# Patient Record
Sex: FEMALE | Race: WHITE | NOT HISPANIC OR LATINO | Employment: OTHER | URBAN - METROPOLITAN AREA
[De-identification: names, ages, dates, MRNs, and addresses within clinical notes are randomized per-mention and may not be internally consistent; named-entity substitution may affect disease eponyms.]

---

## 2020-03-13 ENCOUNTER — OFFICE VISIT (OUTPATIENT)
Dept: OPTOMETRY | Facility: CLINIC | Age: 75
End: 2020-03-13
Payer: MEDICARE

## 2020-03-13 DIAGNOSIS — Z83.518 FAMILY HISTORY OF MACULAR DEGENERATION: ICD-10-CM

## 2020-03-13 DIAGNOSIS — H52.7 REFRACTIVE ERROR: ICD-10-CM

## 2020-03-13 DIAGNOSIS — E11.9 TYPE 2 DIABETES MELLITUS WITHOUT RETINOPATHY: Primary | ICD-10-CM

## 2020-03-13 DIAGNOSIS — H18.599 CORNEAL DYSTROPHY, ANTERIOR: ICD-10-CM

## 2020-03-13 DIAGNOSIS — Z96.1 PSEUDOPHAKIA OF BOTH EYES: ICD-10-CM

## 2020-03-13 PROCEDURE — 76514 ECHO EXAM OF EYE THICKNESS: CPT | Mod: 26,S$PBB,, | Performed by: OPTOMETRIST

## 2020-03-13 PROCEDURE — 99212 OFFICE O/P EST SF 10 MIN: CPT | Mod: PBBFAC,PO,25 | Performed by: OPTOMETRIST

## 2020-03-13 PROCEDURE — 92015 PR REFRACTION: ICD-10-PCS | Mod: ,,, | Performed by: OPTOMETRIST

## 2020-03-13 PROCEDURE — 92015 DETERMINE REFRACTIVE STATE: CPT | Mod: ,,, | Performed by: OPTOMETRIST

## 2020-03-13 PROCEDURE — 92004 COMPRE OPH EXAM NEW PT 1/>: CPT | Mod: S$PBB,,, | Performed by: OPTOMETRIST

## 2020-03-13 PROCEDURE — 99999 PR PBB SHADOW E&M-EST. PATIENT-LVL II: CPT | Mod: PBBFAC,,, | Performed by: OPTOMETRIST

## 2020-03-13 PROCEDURE — 92004 PR EYE EXAM, NEW PATIENT,COMPREHESV: ICD-10-PCS | Mod: S$PBB,,, | Performed by: OPTOMETRIST

## 2020-03-13 PROCEDURE — 76514 PR  US, EYE, FOR CORNEAL THICKNESS: ICD-10-PCS | Mod: 26,S$PBB,, | Performed by: OPTOMETRIST

## 2020-03-13 PROCEDURE — 76514 ECHO EXAM OF EYE THICKNESS: CPT | Mod: PBBFAC,PO | Performed by: OPTOMETRIST

## 2020-03-13 PROCEDURE — 99999 PR PBB SHADOW E&M-EST. PATIENT-LVL II: ICD-10-PCS | Mod: PBBFAC,,, | Performed by: OPTOMETRIST

## 2020-03-13 NOTE — LETTER
March 13, 2020      MEÑO Abdalla  [address]  [place] [address]           [place] [facility]  [address].  [place] [address]  Phone: [phone]  Fax: [phone]          Patient: Debbie Metcalf   MR Number: [id number]   YOB: 1945   Date of Visit: 3/13/2020       Dear Rasheeda Donaldson:    Thank you for referring Debbie Metcalf to me for evaluation. Attached you will find relevant portions of my assessment and plan of care.    If you have questions, please do not hesitate to call me. I look forward to following Debbie Metcalf along with you.    Sincerely,    Polo Bunn, OD    Enclosure  CC:  No Recipients    If you would like to receive this communication electronically, please contact [email or web address] or [phone] to request more information on [employer] Link access.    For providers and/or their staff who would like to refer a patient to [facility], please contact us through our one-stop-shop provider referral line, [facility], at [phone].    If you feel you have received this communication in error or would no longer like to receive these types of communications, please e-mail [email or web address]

## 2020-03-13 NOTE — PROGRESS NOTES
HPI     Blur ou at dist, x mos, no assoc pain or red, no relief over time,   constant  Hazy vision  Diabetic eye exam    Last edited by Polo Bunn, OD on 3/13/2020 11:17 AM. (History)            Assessment /Plan     For exam results, see Encounter Report.    Type 2 diabetes mellitus without retinopathy    Corneal dystrophy, anterior    Pseudophakia of both eyes    Family history of macular degeneration    Refractive error      1. No diabetic retinopathy, no csme. Return in 1 year for dilated eye exam.  2. Refer to [facility] for eval to see if acuity corresponds to level of corneal dystrophy. Pachy thick  3. Monitor condition. Patient to report any changes. RTC 1 year recheck.  4. Cont with yearly dfe.  5. New Spec Rx given. Different lens options discussed with patient. RTC 1 year full exam.

## 2020-04-29 ENCOUNTER — TELEPHONE (OUTPATIENT)
Dept: OTOLARYNGOLOGY | Facility: CLINIC | Age: 75
End: 2020-04-29

## 2020-04-29 NOTE — TELEPHONE ENCOUNTER
Left message for patient to call the office regarding her appointment on Tuesday May 5, 2020. Due to Covid-19 we are not seeing patients in the office,but can do a virtual visit.

## 2020-04-29 NOTE — TELEPHONE ENCOUNTER
Spoke with patient and explained to her we are not seeing patients in the office due to Covid-19. I offered her a virtual visit she asked me to cancel because she is not leaving her house.Patient voice understanding.

## 2020-07-06 ENCOUNTER — TELEPHONE (OUTPATIENT)
Dept: OPHTHALMOLOGY | Facility: CLINIC | Age: 75
End: 2020-07-06

## 2020-07-14 ENCOUNTER — TELEPHONE (OUTPATIENT)
Dept: OPHTHALMOLOGY | Facility: CLINIC | Age: 75
End: 2020-07-14

## 2020-07-21 ENCOUNTER — OFFICE VISIT (OUTPATIENT)
Dept: OPHTHALMOLOGY | Facility: CLINIC | Age: 75
End: 2020-07-21
Payer: MEDICARE

## 2020-07-21 DIAGNOSIS — H18.529 ANTERIOR BASEMENT MEMBRANE DYSTROPHY: ICD-10-CM

## 2020-07-21 DIAGNOSIS — H18.519 ENDOTHELIAL CORNEAL DYSTROPHY: Primary | ICD-10-CM

## 2020-07-21 PROCEDURE — 92014 PR EYE EXAM, EST PATIENT,COMPREHESV: ICD-10-PCS | Mod: S$PBB,,, | Performed by: OPHTHALMOLOGY

## 2020-07-21 PROCEDURE — 99999 PR PBB SHADOW E&M-EST. PATIENT-LVL II: ICD-10-PCS | Mod: PBBFAC,,, | Performed by: OPHTHALMOLOGY

## 2020-07-21 PROCEDURE — 99212 OFFICE O/P EST SF 10 MIN: CPT | Mod: PBBFAC | Performed by: OPHTHALMOLOGY

## 2020-07-21 PROCEDURE — 92014 COMPRE OPH EXAM EST PT 1/>: CPT | Mod: S$PBB,,, | Performed by: OPHTHALMOLOGY

## 2020-07-21 PROCEDURE — 99999 PR PBB SHADOW E&M-EST. PATIENT-LVL II: CPT | Mod: PBBFAC,,, | Performed by: OPHTHALMOLOGY

## 2020-07-21 RX ORDER — ATORVASTATIN CALCIUM 20 MG/1
20 TABLET, FILM COATED ORAL DAILY
Status: ON HOLD | COMMUNITY
End: 2022-01-15 | Stop reason: HOSPADM

## 2020-07-21 RX ORDER — ESTRADIOL 2 MG/1
2 TABLET ORAL 2 TIMES DAILY
COMMUNITY

## 2020-07-21 RX ORDER — SODIUM CHLORIDE 50 MG/ML
1 SOLUTION/ DROPS OPHTHALMIC 3 TIMES DAILY
Qty: 15 ML | Refills: 3 | Status: SHIPPED | OUTPATIENT
Start: 2020-07-21 | End: 2021-07-21

## 2020-07-21 RX ORDER — INSULIN ASPART 100 [IU]/ML
INJECTION, SOLUTION INTRAVENOUS; SUBCUTANEOUS
COMMUNITY

## 2020-07-21 NOTE — PROGRESS NOTES
HPI     Ref by Dr. Bunn     T2 DM w/out retinopathy   fuchs  Pseudophakia OU- Lanoux  + fam hx of mac deg    75 Female here for fuch. Pt states that her vision is blurry and   photophobic. Oftentimes, blurry va is intermittent. Recent removal of   cancerous tumor nasal cavity. No other ocular complaints.     Last edited by Kamille Martinez MD on 7/21/2020  3:59 PM. (History)            Assessment /Plan     For exam results, see Encounter Report.    Endothelial corneal dystrophy    Anterior basement membrane dystrophy    Other orders  -     sodium chloride 5% (ADEBAYO 128) 5 % ophthalmic solution; Place 1 drop into both eyes 3 (three) times daily.  Dispense: 15 mL; Refill: 3          T2 DM w/out retinopathy    Fuchs  -  Thick corneas however only minimal guttae  - BCVA 20/60 OD, 20/40 OS -- however OS thicker than OD  - trial of adebayo, can discuss sx if NI    Pseudophakia OU- Lanoux    + fam hx of mac deg          F/up 6 wks - va OU, pachy OU, ARx, DFE and OCT mac OU

## 2020-07-21 NOTE — LETTER
July 22, 2020      Polo Bunn, OD  [address]  [place] [address]           [facility]  [address]  [place] [address]  Phone: [phone]  Fax: [phone]          Patient: Debbie Metcalf   MR Number: [id number]   YOB: 1945   Date of Visit: 7/21/2020       Dear Dr. Polo Bunn:    Thank you for referring Debbie Metcalf to me for evaluation. Attached you will find relevant portions of my assessment and plan of care.    If you have questions, please do not hesitate to call me. I look forward to following Debbie Metcalf along with you.    Sincerely,    Kamille Martinez MD    Enclosure  CC:  No Recipients    If you would like to receive this communication electronically, please contact [email or web address] or [phone] to request more information on [employer] Link access.    For providers and/or their staff who would like to refer a patient to [facility], please contact us through our one-stop-shop provider referral line, [facility], at [phone].    If you feel you have received this communication in error or would no longer like to receive these types of communications, please e-mail [email or web address]

## 2020-09-01 ENCOUNTER — OFFICE VISIT (OUTPATIENT)
Dept: OPHTHALMOLOGY | Facility: CLINIC | Age: 75
End: 2020-09-01
Payer: MEDICARE

## 2020-09-01 ENCOUNTER — TELEPHONE (OUTPATIENT)
Dept: OPHTHALMOLOGY | Facility: CLINIC | Age: 75
End: 2020-09-01

## 2020-09-01 ENCOUNTER — CLINICAL SUPPORT (OUTPATIENT)
Dept: AUDIOLOGY | Facility: CLINIC | Age: 75
End: 2020-09-01
Payer: MEDICARE

## 2020-09-01 ENCOUNTER — OFFICE VISIT (OUTPATIENT)
Dept: OTOLARYNGOLOGY | Facility: CLINIC | Age: 75
End: 2020-09-01
Payer: MEDICARE

## 2020-09-01 DIAGNOSIS — H69.93 DYSFUNCTION OF BOTH EUSTACHIAN TUBES: ICD-10-CM

## 2020-09-01 DIAGNOSIS — H93.13 TINNITUS, BILATERAL: ICD-10-CM

## 2020-09-01 DIAGNOSIS — H53.15 VISUAL DISTORTIONS OF SHAPE AND SIZE: ICD-10-CM

## 2020-09-01 DIAGNOSIS — H90.3 SENSORINEURAL HEARING LOSS (SNHL) OF BOTH EARS: Primary | ICD-10-CM

## 2020-09-01 DIAGNOSIS — H40.003 GLAUCOMA SUSPECT, BOTH EYES: ICD-10-CM

## 2020-09-01 DIAGNOSIS — H90.3 SENSORINEURAL HEARING LOSS, BILATERAL: Primary | ICD-10-CM

## 2020-09-01 DIAGNOSIS — H40.89 OTHER GLAUCOMA OF BOTH EYES: ICD-10-CM

## 2020-09-01 DIAGNOSIS — H18.519 ENDOTHELIAL CORNEAL DYSTROPHY: Primary | ICD-10-CM

## 2020-09-01 DIAGNOSIS — H18.529 ANTERIOR BASEMENT MEMBRANE DYSTROPHY: ICD-10-CM

## 2020-09-01 PROCEDURE — 99999 PR PBB SHADOW E&M-EST. PATIENT-LVL II: CPT | Mod: PBBFAC,,, | Performed by: OPHTHALMOLOGY

## 2020-09-01 PROCEDURE — 92567 TYMPANOMETRY: CPT | Mod: PBBFAC | Performed by: AUDIOLOGIST

## 2020-09-01 PROCEDURE — 99999 PR PBB SHADOW E&M-EST. PATIENT-LVL I: ICD-10-PCS | Mod: PBBFAC,,, | Performed by: AUDIOLOGIST

## 2020-09-01 PROCEDURE — 99203 PR OFFICE/OUTPT VISIT, NEW, LEVL III, 30-44 MIN: ICD-10-PCS | Mod: S$PBB,,, | Performed by: NURSE PRACTITIONER

## 2020-09-01 PROCEDURE — 92133 CPTRZD OPH DX IMG PST SGM ON: CPT | Mod: PBBFAC | Performed by: OPHTHALMOLOGY

## 2020-09-01 PROCEDURE — 99213 OFFICE O/P EST LOW 20 MIN: CPT | Mod: PBBFAC,27,25 | Performed by: NURSE PRACTITIONER

## 2020-09-01 PROCEDURE — 92134 POSTERIOR SEGMENT OCT RETINA (OCULAR COHERENCE TOMOGRAPHY)-BOTH EYES: ICD-10-PCS | Mod: 26,S$PBB,, | Performed by: OPHTHALMOLOGY

## 2020-09-01 PROCEDURE — 99211 OFF/OP EST MAY X REQ PHY/QHP: CPT | Mod: PBBFAC,25 | Performed by: AUDIOLOGIST

## 2020-09-01 PROCEDURE — 99999 PR PBB SHADOW E&M-EST. PATIENT-LVL III: CPT | Mod: PBBFAC,,, | Performed by: NURSE PRACTITIONER

## 2020-09-01 PROCEDURE — 99999 PR PBB SHADOW E&M-EST. PATIENT-LVL II: ICD-10-PCS | Mod: PBBFAC,,, | Performed by: OPHTHALMOLOGY

## 2020-09-01 PROCEDURE — 92014 COMPRE OPH EXAM EST PT 1/>: CPT | Mod: S$PBB,,, | Performed by: OPHTHALMOLOGY

## 2020-09-01 PROCEDURE — 99203 OFFICE O/P NEW LOW 30 MIN: CPT | Mod: S$PBB,,, | Performed by: NURSE PRACTITIONER

## 2020-09-01 PROCEDURE — 99999 PR PBB SHADOW E&M-EST. PATIENT-LVL I: CPT | Mod: PBBFAC,,, | Performed by: AUDIOLOGIST

## 2020-09-01 PROCEDURE — 92557 COMPREHENSIVE HEARING TEST: CPT | Mod: PBBFAC | Performed by: AUDIOLOGIST

## 2020-09-01 PROCEDURE — 99212 OFFICE O/P EST SF 10 MIN: CPT | Mod: PBBFAC,27,25 | Performed by: OPHTHALMOLOGY

## 2020-09-01 PROCEDURE — 92014 PR EYE EXAM, EST PATIENT,COMPREHESV: ICD-10-PCS | Mod: S$PBB,,, | Performed by: OPHTHALMOLOGY

## 2020-09-01 PROCEDURE — 99999 PR PBB SHADOW E&M-EST. PATIENT-LVL III: ICD-10-PCS | Mod: PBBFAC,,, | Performed by: NURSE PRACTITIONER

## 2020-09-01 PROCEDURE — 92134 CPTRZ OPH DX IMG PST SGM RTA: CPT | Mod: PBBFAC | Performed by: OPHTHALMOLOGY

## 2020-09-01 NOTE — TELEPHONE ENCOUNTER
lvm and call back number to change 3 mo f/u to 6 mo f/u           ----- Message from Kamille Martinez MD sent at 9/1/2020  1:47 PM CDT -----  F/up 6 mo -   jose guadalupe f/up

## 2020-09-01 NOTE — PROGRESS NOTES
"  Subjective:      Debbie Metcalf is a 75 y.o. female who was self-referred for hearing loss.    Ms. Metcalf reports difficulty with hearing for the past several years. He has associated intermittent tinnitus. He denies dizziness. He also reports right ear pain that comes and goes with associated "Popping" in ears. He denies ear drainage. He has tried nasal insufflation with some benefit, but states he has not been able to clear his right ear.   There is not a family history of hearing loss at a young age.  There is not a prior history of ear surgery.  There is a prior history of ear infections as a child.  She denies a history of significant noise exposure.  She does not wear hearing aids currently.  She has not had a hearing test recently.     Past Medical History  She has no past medical history on file.    Past Surgical History  She has no past surgical history on file.    Family History  Her family history is not on file.    Social History  She     Allergies  She has No Known Allergies.    Medications  She has a current medication list which includes the following prescription(s): atorvastatin, estradiol, insulin aspart u-100, insulin detemir u-100, lancets, and sodium chloride 5%.    Review of Systems   Constitutional: Negative for chills, fever and unexpected weight change.   HENT: Positive for hearing loss and tinnitus. Negative for congestion, ear discharge, ear pain, facial swelling, postnasal drip, sinus pressure, sore throat and trouble swallowing.    Eyes: Negative for pain and visual disturbance.   Respiratory: Negative for apnea and shortness of breath.    Cardiovascular: Negative for chest pain and palpitations.   Gastrointestinal: Negative for abdominal pain and nausea.   Endocrine: Negative for cold intolerance and heat intolerance.   Musculoskeletal: Negative for joint swelling and neck stiffness.   Skin: Negative for color change and rash.   Neurological: Negative for dizziness, facial " asymmetry and headaches.   Hematological: Negative for adenopathy. Does not bruise/bleed easily.   Psychiatric/Behavioral: Negative for agitation. The patient is not nervous/anxious.           Objective:     There were no vitals taken for this visit.     Constitutional:   She is oriented to person, place, and time. Vital signs are normal. She appears well-developed and well-nourished. She appears alert. Normal speech.      Head:  Normocephalic and atraumatic.     Ears:    Right Ear: No lacerations. No drainage, swelling or tenderness. No foreign bodies. No mastoid tenderness. Tympanic membrane is not injected, not scarred, not perforated, not erythematous, not retracted and not bulging. Tympanic membrane mobility is normal. No middle ear effusion. No hemotympanum. Decreased hearing is noted.   Left Ear: No lacerations. No drainage, swelling or tenderness. No foreign bodies. No mastoid tenderness. Tympanic membrane is not injected, not scarred, not perforated, not erythematous, not retracted and not bulging. Tympanic membrane mobility is normal.  No middle ear effusion. No hemotympanum. Decreased hearing is noted.     Nose:  Nose normal including turbinates, nasal mucosa, sinuses and nasal septum.     Neck:  Neck normal without thyromegaly masses, asymmetry, normal tracheal structure, crepitus, and tenderness and no adenopathy.     Psychiatric:   She has a normal mood and affect. Her speech is normal and behavior is normal.     Neurological:   She is alert and oriented to person, place, and time.       Procedure    None      Data Reviewed    No results found for: WBC  No results found for: PLT   No results found for: CREATININE  No results found for: TSH  No results found for: GLU  No results found for: HGBA1C       I independently reviewed the tracings of the complete audiometric evaluation performed today.  I reviewed the audiogram with the patient as well.  Pertinent findings include left moderate to severe SNHL.  right moderate to severe SNHL. Right SRT 50 with 76% discrimination at 90db. Left SRT 45 with 92% discrimination at 85db. Type A tympanogram in both ears. .         Assessment:     1. Sensorineural hearing loss (SNHL) of both ears    2. Tinnitus, bilateral    3. Dysfunction of both eustachian tubes         Plan:     I had a long discussion with the patient regarding her condition and the further workup and management options.    Audiogram Reviewed: Bilateral SNHL.  Hearing conservation strongly recommended.  Trial of amplification bilaterally also recommended. Priscilla Carl's card provided to patient.  Re-check of hearing in 1 year or sooner if subjective change noted.  I recommend fluticasone 1-2 sprays each nostril once daily for one month then as needed for ETD.    Follow up in about 1 year (around 9/1/2021).

## 2020-09-01 NOTE — PROGRESS NOTES
HPI     Ref by Dr. Bunn     T2 DM w/out retinopathy   fuchs   Pseudophakia OU- Lanoux   + fam hx of mac deg, dad with glc     Adebayo 128 5% tid ou     74 YO female here for 6 week f/u fuchs. Pt reports that her aching has   improved after starting eye gtts. Also mentions that if pt does not   control blood sugar lvls, will start seeing spots/floaters.  No other   ocular complaints.     Last edited by Kamille Martinez MD on 9/1/2020  9:06 AM. (History)            Assessment /Plan     For exam results, see Encounter Report.    Endothelial corneal dystrophy  -     Posterior Segment OCT Retina-Both eyes    Anterior basement membrane dystrophy    Glaucoma suspect, both eyes  -     Posterior Segment OCT Optic Nerve- Both eyes    Other glaucoma of both eyes   -     Posterior Segment OCT Optic Nerve- Both eyes    Visual distortions of shape and size   -     Posterior Segment OCT Retina-Both eyes    Other glaucoma of both eyes  -     Posterior Segment OCT Optic Nerve- Both eyes        T2 DM w/out retinopathy  - OCT mac flat    Fuchs  - VA slightly improved on adebayo - pt notes discomfort better as well  - will consider surgery - wants to hold off for now    Pseudophakia OU- Lanoux    + fam hx of mac deg        GS  - incr c/d OU, OCT nerves wnl - observe    F/up 6 mo - va OU

## 2020-09-01 NOTE — PROGRESS NOTES
Debbie Metcalf was seen today in the clinic for an audiologic evaluation.  Patients main complaint was eustachian tube dysfunction; right worse than left.  Mrs. Metcalf reported a history of chronic ear infections since she was a child.  Mrs. Metcalf also reported she has adult onset Type II Diabetes.    Tympanometry revealed Type A in the right ear and Type A with slight negative pressure in the left ear.  Audiogram results revealed a moderate to severe SNHL in the right ear and a mild to moderate SNHL in the left ear.  Speech reception thresholds were noted at 50 dB in the right ear and 45 dB in the left ear.  Speech discrimination scores were 76% in the right ear and 92% in the left ear.    Recommendations:  1. Otologic evaluation  2. Annual audiogram  3. Noise protection when in noise  4. Hearing aid consultation

## 2020-09-01 NOTE — PATIENT INSTRUCTIONS
How Hearing Aids Can Help You    If youre losing your hearing, it can be frustrating: But, hearing aids can help you hear what youve been missing. Not everyone who has hearing loss needs hearing aids. But if your hearing loss is keeping you from communicating with others, hearing aids will most likely help you.  What hearing aids do  After getting used to your new hearing aids, you may find that:  · You hear and understand speech better in many cases.  · Youre able to join in when talking with a group of people.  · You hear certain speech sounds more clearly.  · You can hear warning signs that help you stay safe, such as a smoke alarm or car horn.  · Life is more enjoyable for you and the people around you.  How hearing aids help you hear  Hearing aids help by making most sounds clearer for the brain. Sounds you cant hear as well are made louder. Hearing aids also filter sound to reduce some background noise. And they soften some sounds that may be too loud. As a result, signals traveling to the brain are easier to understand.  The microphone picks up sound and carries it into the hearing aid. The amplifier makes the sound louder and clearer. The  sends this stronger sound into the ear canal. The stronger sound travels the rest of the way into the ear to the brain.    Setting realistic expectations  Advances in technology have made todays hearing aids better than ever. But your hearing still wont be perfect. You may not hear all sounds. And you wont hear only the things you want to. In noisy places you may still have trouble hearing speech clearly. Even so, you can learn techniques for better listening. Along with hearing aids, these techniques will help you understand whats happening around you much better.   Date Last Reviewed: 9/1/2016  © 4627-6947 [employer]. [address] [place] [address], [place] [address]. All rights reserved. This information is not intended as a substitute for  professional medical care. Always follow your healthcare professional's instructions.        Tinnitus (Ringing in the Ears)     Treatment may include maskers and hearing aids.     Tinnitus is the term for a noise in your ear not caused by an outside sound. The noise might be a ringing, clicking, hiss, or roar. It can vary in pitch and may be soft or quite loud. For some people, tinnitus is a minor nuisance. But for others, the noise can make it hard to hear, work, and even sleep. When tinnitus can't be cured, a number of treatments may offer relief.  What causes tinnitus?  Loud noises, hearing loss, and ear wax can cause tinnitus. So can certain medicines. Large amounts of aspirin or caffeine are sometimes to blame. In many cases, the exact cause of tinnitus is unknown.  How is tinnitus treated?  Identifying and removing the cause is the best way to treat tinnitus. For that reason, your healthcare provider may refer you to an otolaryngologist (ear, nose, and throat doctor). Your hearing may also be checked by an audiologist (hearing specialist). If you have hearing loss, wearing a hearing aid may help your tinnitus. When the cause can't be found, the tinnitus itself may be treated. Some of the treatments are listed below, and your healthcare provider can tell you more about them:  · Maskers are small devices that look like hearing aids. They emit a pleasant sound that helps cover up the ringing in your ears. Hearing aids and maskers are sometimes used together.  · Medicines that treat anxiety and depression may ease tinnitus in some people.  · Hypnosis or relaxation therapy may help head noise seem less severe.  · Tinnitus retraining therapy combines counseling and maskers. Both can help take your mind off the tinnitus.  For more information  · American Speech-Hearing-Language Association 819-647-9546 www.lucius.org  · American Tinnitus Association 965-314-3950 www.meaghan.org  · National [place] on Deafness and other  Communication Disorders 110-907-1351 www.nidcd.nih.gov   Date Last Reviewed: 7/1/2016  © 5603-1459 [employer]. [address] [place] [address], [place] [address]. All rights reserved. This information is not intended as a substitute for professional medical care. Always follow your healthcare professional's instructions.

## 2021-01-21 ENCOUNTER — IMMUNIZATION (OUTPATIENT)
Dept: PHARMACY | Facility: CLINIC | Age: 76
End: 2021-01-21
Payer: MEDICARE

## 2021-01-21 DIAGNOSIS — Z23 NEED FOR VACCINATION: Primary | ICD-10-CM

## 2021-02-18 ENCOUNTER — IMMUNIZATION (OUTPATIENT)
Dept: PHARMACY | Facility: CLINIC | Age: 76
End: 2021-02-18
Payer: MEDICARE

## 2021-02-18 DIAGNOSIS — Z23 NEED FOR VACCINATION: Primary | ICD-10-CM

## 2021-04-15 ENCOUNTER — HOSPITAL ENCOUNTER (INPATIENT)
Facility: HOSPITAL | Age: 76
LOS: 2 days | Discharge: HOME | DRG: 552 | End: 2021-04-18
Attending: EMERGENCY MEDICINE | Admitting: NEUROLOGICAL SURGERY
Payer: MEDICARE
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
## 2021-04-15 DIAGNOSIS — S22.069A CLOSED FRACTURE OF EIGHTH THORACIC VERTEBRA, UNSPECIFIED FRACTURE MORPHOLOGY, INITIAL ENCOUNTER: Primary | ICD-10-CM

## 2021-04-15 DIAGNOSIS — S22.060A CLOSED WEDGE COMPRESSION FRACTURE OF T8 VERTEBRA, INITIAL ENCOUNTER: Primary | ICD-10-CM

## 2021-04-15 LAB
CTP QC/QA: YES
GLUCOSE SERPL-MCNC: 191 MG/DL (ref 70–110)
HCV AB SERPL QL IA: NEGATIVE
POCT GLUCOSE: 191 MG/DL (ref 70–110)
SARS-COV-2 RDRP RESP QL NAA+PROBE: NEGATIVE

## 2021-04-15 PROCEDURE — 99285 EMERGENCY DEPT VISIT HI MDM: CPT | Mod: 25

## 2021-04-15 PROCEDURE — 99223 PR INITIAL HOSPITAL CARE,LEVL III: ICD-10-PCS | Mod: ,,, | Performed by: PHYSICIAN ASSISTANT

## 2021-04-15 PROCEDURE — U0002 COVID-19 LAB TEST NON-CDC: HCPCS | Performed by: EMERGENCY MEDICINE

## 2021-04-15 PROCEDURE — 25000003 PHARM REV CODE 250: Performed by: PHYSICIAN ASSISTANT

## 2021-04-15 PROCEDURE — 86803 HEPATITIS C AB TEST: CPT | Performed by: EMERGENCY MEDICINE

## 2021-04-15 PROCEDURE — 82962 GLUCOSE BLOOD TEST: CPT

## 2021-04-15 PROCEDURE — 99285 PR EMERGENCY DEPT VISIT,LEVEL V: ICD-10-PCS | Mod: CS,,, | Performed by: PHYSICIAN ASSISTANT

## 2021-04-15 PROCEDURE — 99223 1ST HOSP IP/OBS HIGH 75: CPT | Mod: ,,, | Performed by: PHYSICIAN ASSISTANT

## 2021-04-15 PROCEDURE — 99285 EMERGENCY DEPT VISIT HI MDM: CPT | Mod: CS,,, | Performed by: PHYSICIAN ASSISTANT

## 2021-04-15 RX ORDER — SPIRONOLACTONE 25 MG/1
25 TABLET ORAL DAILY
COMMUNITY

## 2021-04-15 RX ORDER — HYDROCODONE BITARTRATE AND ACETAMINOPHEN 10; 325 MG/1; MG/1
1 TABLET ORAL EVERY 6 HOURS PRN
Status: DISCONTINUED | OUTPATIENT
Start: 2021-04-15 | End: 2021-04-18 | Stop reason: HOSPADM

## 2021-04-15 RX ORDER — SPIRONOLACTONE 25 MG/1
25 TABLET ORAL DAILY
Status: DISCONTINUED | OUTPATIENT
Start: 2021-04-16 | End: 2021-04-18 | Stop reason: HOSPADM

## 2021-04-15 RX ORDER — IBUPROFEN 200 MG
24 TABLET ORAL
Status: DISCONTINUED | OUTPATIENT
Start: 2021-04-15 | End: 2021-04-18 | Stop reason: HOSPADM

## 2021-04-15 RX ORDER — MORPHINE SULFATE 2 MG/ML
1 INJECTION, SOLUTION INTRAMUSCULAR; INTRAVENOUS
Status: DISCONTINUED | OUTPATIENT
Start: 2021-04-15 | End: 2021-04-18 | Stop reason: HOSPADM

## 2021-04-15 RX ORDER — INSULIN ASPART 100 [IU]/ML
1-10 INJECTION, SOLUTION INTRAVENOUS; SUBCUTANEOUS
Status: DISCONTINUED | OUTPATIENT
Start: 2021-04-15 | End: 2021-04-18 | Stop reason: HOSPADM

## 2021-04-15 RX ORDER — ATORVASTATIN CALCIUM 20 MG/1
20 TABLET, FILM COATED ORAL DAILY
Status: DISCONTINUED | OUTPATIENT
Start: 2021-04-16 | End: 2021-04-18 | Stop reason: HOSPADM

## 2021-04-15 RX ORDER — GLUCAGON 1 MG
1 KIT INJECTION
Status: DISCONTINUED | OUTPATIENT
Start: 2021-04-15 | End: 2021-04-18 | Stop reason: HOSPADM

## 2021-04-15 RX ORDER — METHOCARBAMOL 750 MG/1
750 TABLET, FILM COATED ORAL EVERY 8 HOURS PRN
Status: DISCONTINUED | OUTPATIENT
Start: 2021-04-15 | End: 2021-04-18 | Stop reason: HOSPADM

## 2021-04-15 RX ORDER — IBUPROFEN 200 MG
16 TABLET ORAL
Status: DISCONTINUED | OUTPATIENT
Start: 2021-04-15 | End: 2021-04-18 | Stop reason: HOSPADM

## 2021-04-15 RX ORDER — OXYCODONE AND ACETAMINOPHEN 5; 325 MG/1; MG/1
1 TABLET ORAL EVERY 6 HOURS PRN
Qty: 12 TABLET | Refills: 0 | Status: SHIPPED | OUTPATIENT
Start: 2021-04-15 | End: 2021-04-18 | Stop reason: SDUPTHER

## 2021-04-15 RX ORDER — SODIUM CHLORIDE 50 MG/ML
1 SOLUTION/ DROPS OPHTHALMIC 3 TIMES DAILY
Status: DISCONTINUED | OUTPATIENT
Start: 2021-04-15 | End: 2021-04-18 | Stop reason: HOSPADM

## 2021-04-15 RX ORDER — HYDROCODONE BITARTRATE AND ACETAMINOPHEN 10; 325 MG/1; MG/1
1 TABLET ORAL
Status: COMPLETED | OUTPATIENT
Start: 2021-04-15 | End: 2021-04-15

## 2021-04-15 RX ORDER — HYDROCODONE BITARTRATE AND ACETAMINOPHEN 5; 325 MG/1; MG/1
1 TABLET ORAL EVERY 4 HOURS PRN
Status: DISCONTINUED | OUTPATIENT
Start: 2021-04-15 | End: 2021-04-15

## 2021-04-15 RX ORDER — ONDANSETRON 4 MG/1
4 TABLET, ORALLY DISINTEGRATING ORAL EVERY 6 HOURS PRN
Status: DISCONTINUED | OUTPATIENT
Start: 2021-04-15 | End: 2021-04-18 | Stop reason: HOSPADM

## 2021-04-15 RX ORDER — ACETAMINOPHEN 500 MG
1000 TABLET ORAL
Status: COMPLETED | OUTPATIENT
Start: 2021-04-15 | End: 2021-04-15

## 2021-04-15 RX ORDER — LIDOCAINE 50 MG/G
1 PATCH TOPICAL ONCE
Status: COMPLETED | OUTPATIENT
Start: 2021-04-15 | End: 2021-04-15

## 2021-04-15 RX ORDER — AMOXICILLIN 250 MG
2 CAPSULE ORAL NIGHTLY PRN
Status: DISCONTINUED | OUTPATIENT
Start: 2021-04-15 | End: 2021-04-18 | Stop reason: HOSPADM

## 2021-04-15 RX ADMIN — LIDOCAINE 1 PATCH: 50 PATCH TOPICAL at 11:04

## 2021-04-15 RX ADMIN — ACETAMINOPHEN 1000 MG: 500 TABLET, FILM COATED ORAL at 11:04

## 2021-04-15 RX ADMIN — HYDROCODONE BITARTRATE AND ACETAMINOPHEN 1 TABLET: 10; 325 TABLET ORAL at 07:04

## 2021-04-16 ENCOUNTER — TELEPHONE (OUTPATIENT)
Dept: NEUROSURGERY | Facility: CLINIC | Age: 76
End: 2021-04-16

## 2021-04-16 LAB
ABO + RH BLD: NORMAL
ANION GAP SERPL CALC-SCNC: 8 MMOL/L (ref 8–16)
APTT BLDCRRT: 22.6 SEC (ref 21–32)
BASOPHILS # BLD AUTO: 0.06 K/UL (ref 0–0.2)
BASOPHILS NFR BLD: 0.6 % (ref 0–1.9)
BLD GP AB SCN CELLS X3 SERPL QL: NORMAL
BUN SERPL-MCNC: 14 MG/DL (ref 8–23)
CALCIUM SERPL-MCNC: 9.3 MG/DL (ref 8.7–10.5)
CHLORIDE SERPL-SCNC: 102 MMOL/L (ref 95–110)
CO2 SERPL-SCNC: 26 MMOL/L (ref 23–29)
CREAT SERPL-MCNC: 1 MG/DL (ref 0.5–1.4)
DIFFERENTIAL METHOD: ABNORMAL
EOSINOPHIL # BLD AUTO: 0.2 K/UL (ref 0–0.5)
EOSINOPHIL NFR BLD: 2 % (ref 0–8)
ERYTHROCYTE [DISTWIDTH] IN BLOOD BY AUTOMATED COUNT: 12.3 % (ref 11.5–14.5)
EST. GFR  (AFRICAN AMERICAN): >60 ML/MIN/1.73 M^2
EST. GFR  (NON AFRICAN AMERICAN): 56 ML/MIN/1.73 M^2
GLUCOSE SERPL-MCNC: 113 MG/DL (ref 70–110)
HCT VFR BLD AUTO: 40 % (ref 37–48.5)
HGB BLD-MCNC: 12.9 G/DL (ref 12–16)
IMM GRANULOCYTES # BLD AUTO: 0.02 K/UL (ref 0–0.04)
IMM GRANULOCYTES NFR BLD AUTO: 0.2 % (ref 0–0.5)
INR PPP: 0.9 (ref 0.8–1.2)
LYMPHOCYTES # BLD AUTO: 3 K/UL (ref 1–4.8)
LYMPHOCYTES NFR BLD: 31.3 % (ref 18–48)
MCH RBC QN AUTO: 29.6 PG (ref 27–31)
MCHC RBC AUTO-ENTMCNC: 32.3 G/DL (ref 32–36)
MCV RBC AUTO: 92 FL (ref 82–98)
MONOCYTES # BLD AUTO: 0.8 K/UL (ref 0.3–1)
MONOCYTES NFR BLD: 8 % (ref 4–15)
NEUTROPHILS # BLD AUTO: 5.5 K/UL (ref 1.8–7.7)
NEUTROPHILS NFR BLD: 57.9 % (ref 38–73)
NRBC BLD-RTO: 0 /100 WBC
PLATELET # BLD AUTO: 232 K/UL (ref 150–450)
PMV BLD AUTO: 8.9 FL (ref 9.2–12.9)
POCT GLUCOSE: 145 MG/DL (ref 70–110)
POCT GLUCOSE: 158 MG/DL (ref 70–110)
POTASSIUM SERPL-SCNC: 4.3 MMOL/L (ref 3.5–5.1)
PROTHROMBIN TIME: 10.3 SEC (ref 9–12.5)
RBC # BLD AUTO: 4.36 M/UL (ref 4–5.4)
SODIUM SERPL-SCNC: 136 MMOL/L (ref 136–145)
WBC # BLD AUTO: 9.54 K/UL (ref 3.9–12.7)

## 2021-04-16 PROCEDURE — 25000003 PHARM REV CODE 250: Performed by: PHYSICIAN ASSISTANT

## 2021-04-16 PROCEDURE — 99223 PR INITIAL HOSPITAL CARE,LEVL III: ICD-10-PCS | Mod: AI,,, | Performed by: PHYSICIAN ASSISTANT

## 2021-04-16 PROCEDURE — 85730 THROMBOPLASTIN TIME PARTIAL: CPT | Performed by: PHYSICIAN ASSISTANT

## 2021-04-16 PROCEDURE — 85610 PROTHROMBIN TIME: CPT | Performed by: PHYSICIAN ASSISTANT

## 2021-04-16 PROCEDURE — 86900 BLOOD TYPING SEROLOGIC ABO: CPT | Performed by: PHYSICIAN ASSISTANT

## 2021-04-16 PROCEDURE — 80048 BASIC METABOLIC PNL TOTAL CA: CPT | Performed by: PHYSICIAN ASSISTANT

## 2021-04-16 PROCEDURE — 11000001 HC ACUTE MED/SURG PRIVATE ROOM

## 2021-04-16 PROCEDURE — 85025 COMPLETE CBC W/AUTO DIFF WBC: CPT | Performed by: PHYSICIAN ASSISTANT

## 2021-04-16 PROCEDURE — 99223 1ST HOSP IP/OBS HIGH 75: CPT | Mod: AI,,, | Performed by: PHYSICIAN ASSISTANT

## 2021-04-16 PROCEDURE — 63600175 PHARM REV CODE 636 W HCPCS: Performed by: PHYSICIAN ASSISTANT

## 2021-04-16 RX ORDER — ESTRADIOL 1 MG/1
2 TABLET ORAL 2 TIMES DAILY
Status: DISCONTINUED | OUTPATIENT
Start: 2021-04-16 | End: 2021-04-18 | Stop reason: HOSPADM

## 2021-04-16 RX ORDER — HEPARIN SODIUM 5000 [USP'U]/ML
5000 INJECTION, SOLUTION INTRAVENOUS; SUBCUTANEOUS EVERY 8 HOURS
Status: DISCONTINUED | OUTPATIENT
Start: 2021-04-16 | End: 2021-04-18 | Stop reason: HOSPADM

## 2021-04-16 RX ORDER — HYDRALAZINE HYDROCHLORIDE 25 MG/1
25 TABLET, FILM COATED ORAL EVERY 8 HOURS PRN
Status: DISCONTINUED | OUTPATIENT
Start: 2021-04-16 | End: 2021-04-18 | Stop reason: HOSPADM

## 2021-04-16 RX ADMIN — SPIRONOLACTONE 25 MG: 25 TABLET, FILM COATED ORAL at 11:04

## 2021-04-16 RX ADMIN — SODIUM CHLORIDE 1 DROP: 50 SOLUTION/ DROPS OPHTHALMIC at 09:04

## 2021-04-16 RX ADMIN — HYDROCODONE BITARTRATE AND ACETAMINOPHEN 1 TABLET: 10; 325 TABLET ORAL at 06:04

## 2021-04-16 RX ADMIN — HYDROCODONE BITARTRATE AND ACETAMINOPHEN 1 TABLET: 10; 325 TABLET ORAL at 09:04

## 2021-04-16 RX ADMIN — HEPARIN SODIUM 5000 UNITS: 5000 INJECTION INTRAVENOUS; SUBCUTANEOUS at 10:04

## 2021-04-16 RX ADMIN — SODIUM CHLORIDE 1 DROP: 50 SOLUTION/ DROPS OPHTHALMIC at 08:04

## 2021-04-16 RX ADMIN — ESTRADIOL 2 MG: 1 TABLET ORAL at 08:04

## 2021-04-16 RX ADMIN — SODIUM CHLORIDE 1 DROP: 50 SOLUTION/ DROPS OPHTHALMIC at 12:04

## 2021-04-16 RX ADMIN — HYDROCODONE BITARTRATE AND ACETAMINOPHEN 1 TABLET: 10; 325 TABLET ORAL at 03:04

## 2021-04-16 RX ADMIN — ATORVASTATIN CALCIUM 20 MG: 20 TABLET, FILM COATED ORAL at 09:04

## 2021-04-17 LAB
ANION GAP SERPL CALC-SCNC: 13 MMOL/L (ref 8–16)
ANION GAP SERPL CALC-SCNC: 9 MMOL/L (ref 8–16)
BASOPHILS # BLD AUTO: 0.06 K/UL (ref 0–0.2)
BASOPHILS NFR BLD: 0.7 % (ref 0–1.9)
BUN SERPL-MCNC: 13 MG/DL (ref 8–23)
BUN SERPL-MCNC: 15 MG/DL (ref 8–23)
CALCIUM SERPL-MCNC: 8.7 MG/DL (ref 8.7–10.5)
CALCIUM SERPL-MCNC: 9 MG/DL (ref 8.7–10.5)
CHLORIDE SERPL-SCNC: 101 MMOL/L (ref 95–110)
CHLORIDE SERPL-SCNC: 99 MMOL/L (ref 95–110)
CO2 SERPL-SCNC: 21 MMOL/L (ref 23–29)
CO2 SERPL-SCNC: 25 MMOL/L (ref 23–29)
CREAT SERPL-MCNC: 1.1 MG/DL (ref 0.5–1.4)
CREAT SERPL-MCNC: 1.3 MG/DL (ref 0.5–1.4)
DIFFERENTIAL METHOD: ABNORMAL
EOSINOPHIL # BLD AUTO: 0.2 K/UL (ref 0–0.5)
EOSINOPHIL NFR BLD: 2.5 % (ref 0–8)
ERYTHROCYTE [DISTWIDTH] IN BLOOD BY AUTOMATED COUNT: 12.4 % (ref 11.5–14.5)
EST. GFR  (AFRICAN AMERICAN): 47 ML/MIN/1.73 M^2
EST. GFR  (AFRICAN AMERICAN): 57.6 ML/MIN/1.73 M^2
EST. GFR  (NON AFRICAN AMERICAN): 40.8 ML/MIN/1.73 M^2
EST. GFR  (NON AFRICAN AMERICAN): 49.9 ML/MIN/1.73 M^2
GLUCOSE SERPL-MCNC: 171 MG/DL (ref 70–110)
GLUCOSE SERPL-MCNC: 215 MG/DL (ref 70–110)
HCT VFR BLD AUTO: 44 % (ref 37–48.5)
HGB BLD-MCNC: 14 G/DL (ref 12–16)
IMM GRANULOCYTES # BLD AUTO: 0.02 K/UL (ref 0–0.04)
IMM GRANULOCYTES NFR BLD AUTO: 0.2 % (ref 0–0.5)
LYMPHOCYTES # BLD AUTO: 3.9 K/UL (ref 1–4.8)
LYMPHOCYTES NFR BLD: 44.5 % (ref 18–48)
MCH RBC QN AUTO: 30 PG (ref 27–31)
MCHC RBC AUTO-ENTMCNC: 31.8 G/DL (ref 32–36)
MCV RBC AUTO: 94 FL (ref 82–98)
MONOCYTES # BLD AUTO: 0.7 K/UL (ref 0.3–1)
MONOCYTES NFR BLD: 7.5 % (ref 4–15)
NEUTROPHILS # BLD AUTO: 3.9 K/UL (ref 1.8–7.7)
NEUTROPHILS NFR BLD: 44.6 % (ref 38–73)
NRBC BLD-RTO: 0 /100 WBC
PLATELET # BLD AUTO: 201 K/UL (ref 150–450)
PLATELET BLD QL SMEAR: ABNORMAL
PMV BLD AUTO: 10.9 FL (ref 9.2–12.9)
POCT GLUCOSE: 207 MG/DL (ref 70–110)
POCT GLUCOSE: 220 MG/DL (ref 70–110)
POCT GLUCOSE: 222 MG/DL (ref 70–110)
POCT GLUCOSE: 254 MG/DL (ref 70–110)
POTASSIUM SERPL-SCNC: 4.5 MMOL/L (ref 3.5–5.1)
POTASSIUM SERPL-SCNC: 5.5 MMOL/L (ref 3.5–5.1)
RBC # BLD AUTO: 4.67 M/UL (ref 4–5.4)
SODIUM SERPL-SCNC: 133 MMOL/L (ref 136–145)
SODIUM SERPL-SCNC: 135 MMOL/L (ref 136–145)
WBC # BLD AUTO: 8.66 K/UL (ref 3.9–12.7)

## 2021-04-17 PROCEDURE — 36415 COLL VENOUS BLD VENIPUNCTURE: CPT | Performed by: PHYSICIAN ASSISTANT

## 2021-04-17 PROCEDURE — 36415 COLL VENOUS BLD VENIPUNCTURE: CPT | Performed by: STUDENT IN AN ORGANIZED HEALTH CARE EDUCATION/TRAINING PROGRAM

## 2021-04-17 PROCEDURE — 85025 COMPLETE CBC W/AUTO DIFF WBC: CPT | Performed by: PHYSICIAN ASSISTANT

## 2021-04-17 PROCEDURE — 63600175 PHARM REV CODE 636 W HCPCS: Performed by: PHYSICIAN ASSISTANT

## 2021-04-17 PROCEDURE — 80048 BASIC METABOLIC PNL TOTAL CA: CPT | Mod: 91 | Performed by: STUDENT IN AN ORGANIZED HEALTH CARE EDUCATION/TRAINING PROGRAM

## 2021-04-17 PROCEDURE — 11000001 HC ACUTE MED/SURG PRIVATE ROOM

## 2021-04-17 PROCEDURE — 25000003 PHARM REV CODE 250: Performed by: PHYSICIAN ASSISTANT

## 2021-04-17 PROCEDURE — 80048 BASIC METABOLIC PNL TOTAL CA: CPT | Performed by: PHYSICIAN ASSISTANT

## 2021-04-17 RX ADMIN — ESTRADIOL 2 MG: 1 TABLET ORAL at 10:04

## 2021-04-17 RX ADMIN — HEPARIN SODIUM 5000 UNITS: 5000 INJECTION INTRAVENOUS; SUBCUTANEOUS at 09:04

## 2021-04-17 RX ADMIN — HEPARIN SODIUM 5000 UNITS: 5000 INJECTION INTRAVENOUS; SUBCUTANEOUS at 02:04

## 2021-04-17 RX ADMIN — HYDROCODONE BITARTRATE AND ACETAMINOPHEN 1 TABLET: 10; 325 TABLET ORAL at 12:04

## 2021-04-17 RX ADMIN — ATORVASTATIN CALCIUM 20 MG: 20 TABLET, FILM COATED ORAL at 08:04

## 2021-04-17 RX ADMIN — HYDROCODONE BITARTRATE AND ACETAMINOPHEN 1 TABLET: 10; 325 TABLET ORAL at 06:04

## 2021-04-17 RX ADMIN — INSULIN ASPART 4 UNITS: 100 INJECTION, SOLUTION INTRAVENOUS; SUBCUTANEOUS at 12:04

## 2021-04-17 RX ADMIN — HEPARIN SODIUM 5000 UNITS: 5000 INJECTION INTRAVENOUS; SUBCUTANEOUS at 05:04

## 2021-04-17 RX ADMIN — SODIUM CHLORIDE 1 DROP: 50 SOLUTION/ DROPS OPHTHALMIC at 09:04

## 2021-04-17 RX ADMIN — METHOCARBAMOL 750 MG: 750 TABLET ORAL at 08:04

## 2021-04-17 RX ADMIN — SPIRONOLACTONE 25 MG: 25 TABLET, FILM COATED ORAL at 08:04

## 2021-04-17 RX ADMIN — METHOCARBAMOL 750 MG: 750 TABLET ORAL at 12:04

## 2021-04-17 RX ADMIN — ESTRADIOL 2 MG: 1 TABLET ORAL at 08:04

## 2021-04-17 RX ADMIN — INSULIN ASPART 4 UNITS: 100 INJECTION, SOLUTION INTRAVENOUS; SUBCUTANEOUS at 08:04

## 2021-04-17 RX ADMIN — SODIUM CHLORIDE 1 DROP: 50 SOLUTION/ DROPS OPHTHALMIC at 04:04

## 2021-04-17 RX ADMIN — SODIUM CHLORIDE 1 DROP: 50 SOLUTION/ DROPS OPHTHALMIC at 08:04

## 2021-04-17 RX ADMIN — INSULIN ASPART 4 UNITS: 100 INJECTION, SOLUTION INTRAVENOUS; SUBCUTANEOUS at 04:04

## 2021-04-17 RX ADMIN — INSULIN ASPART 6 UNITS: 100 INJECTION, SOLUTION INTRAVENOUS; SUBCUTANEOUS at 09:04

## 2021-04-18 VITALS
TEMPERATURE: 99 F | OXYGEN SATURATION: 95 % | HEART RATE: 63 BPM | SYSTOLIC BLOOD PRESSURE: 153 MMHG | RESPIRATION RATE: 16 BRPM | WEIGHT: 238.13 LBS | HEIGHT: 72 IN | DIASTOLIC BLOOD PRESSURE: 81 MMHG | BODY MASS INDEX: 32.25 KG/M2

## 2021-04-18 LAB
ANION GAP SERPL CALC-SCNC: 8 MMOL/L (ref 8–16)
BASOPHILS # BLD AUTO: 0.04 K/UL (ref 0–0.2)
BASOPHILS NFR BLD: 0.5 % (ref 0–1.9)
BUN SERPL-MCNC: 13 MG/DL (ref 8–23)
CALCIUM SERPL-MCNC: 8.4 MG/DL (ref 8.7–10.5)
CHLORIDE SERPL-SCNC: 101 MMOL/L (ref 95–110)
CO2 SERPL-SCNC: 23 MMOL/L (ref 23–29)
CREAT SERPL-MCNC: 1.1 MG/DL (ref 0.5–1.4)
DIFFERENTIAL METHOD: NORMAL
EOSINOPHIL # BLD AUTO: 0.2 K/UL (ref 0–0.5)
EOSINOPHIL NFR BLD: 2.5 % (ref 0–8)
ERYTHROCYTE [DISTWIDTH] IN BLOOD BY AUTOMATED COUNT: 12.1 % (ref 11.5–14.5)
EST. GFR  (AFRICAN AMERICAN): 57.6 ML/MIN/1.73 M^2
EST. GFR  (NON AFRICAN AMERICAN): 49.9 ML/MIN/1.73 M^2
GLUCOSE SERPL-MCNC: 182 MG/DL (ref 70–110)
HCT VFR BLD AUTO: 40 % (ref 37–48.5)
HGB BLD-MCNC: 12.8 G/DL (ref 12–16)
IMM GRANULOCYTES # BLD AUTO: 0.03 K/UL (ref 0–0.04)
IMM GRANULOCYTES NFR BLD AUTO: 0.4 % (ref 0–0.5)
LYMPHOCYTES # BLD AUTO: 3.5 K/UL (ref 1–4.8)
LYMPHOCYTES NFR BLD: 46.8 % (ref 18–48)
MCH RBC QN AUTO: 30.1 PG (ref 27–31)
MCHC RBC AUTO-ENTMCNC: 32 G/DL (ref 32–36)
MCV RBC AUTO: 94 FL (ref 82–98)
MONOCYTES # BLD AUTO: 0.6 K/UL (ref 0.3–1)
MONOCYTES NFR BLD: 7.4 % (ref 4–15)
NEUTROPHILS # BLD AUTO: 3.2 K/UL (ref 1.8–7.7)
NEUTROPHILS NFR BLD: 42.4 % (ref 38–73)
NRBC BLD-RTO: 0 /100 WBC
PLATELET # BLD AUTO: 243 K/UL (ref 150–450)
PMV BLD AUTO: 9.2 FL (ref 9.2–12.9)
POCT GLUCOSE: 148 MG/DL (ref 70–110)
POCT GLUCOSE: 158 MG/DL (ref 70–110)
POTASSIUM SERPL-SCNC: 4.7 MMOL/L (ref 3.5–5.1)
RBC # BLD AUTO: 4.25 M/UL (ref 4–5.4)
SODIUM SERPL-SCNC: 132 MMOL/L (ref 136–145)
WBC # BLD AUTO: 7.48 K/UL (ref 3.9–12.7)

## 2021-04-18 PROCEDURE — 36415 COLL VENOUS BLD VENIPUNCTURE: CPT | Performed by: PHYSICIAN ASSISTANT

## 2021-04-18 PROCEDURE — 25000003 PHARM REV CODE 250: Performed by: PHYSICIAN ASSISTANT

## 2021-04-18 PROCEDURE — 63600175 PHARM REV CODE 636 W HCPCS: Performed by: PHYSICIAN ASSISTANT

## 2021-04-18 PROCEDURE — 80048 BASIC METABOLIC PNL TOTAL CA: CPT | Performed by: PHYSICIAN ASSISTANT

## 2021-04-18 PROCEDURE — 85025 COMPLETE CBC W/AUTO DIFF WBC: CPT | Performed by: PHYSICIAN ASSISTANT

## 2021-04-18 RX ORDER — METHOCARBAMOL 750 MG/1
750 TABLET, FILM COATED ORAL EVERY 8 HOURS PRN
Qty: 30 TABLET | Refills: 0 | Status: SHIPPED | OUTPATIENT
Start: 2021-04-18 | End: 2021-05-02

## 2021-04-18 RX ORDER — OXYCODONE AND ACETAMINOPHEN 5; 325 MG/1; MG/1
1 TABLET ORAL EVERY 6 HOURS PRN
Qty: 12 TABLET | Refills: 0 | Status: SHIPPED | OUTPATIENT
Start: 2021-04-18 | End: 2021-04-25

## 2021-04-18 RX ADMIN — ATORVASTATIN CALCIUM 20 MG: 20 TABLET, FILM COATED ORAL at 08:04

## 2021-04-18 RX ADMIN — ESTRADIOL 2 MG: 1 TABLET ORAL at 08:04

## 2021-04-18 RX ADMIN — SPIRONOLACTONE 25 MG: 25 TABLET, FILM COATED ORAL at 08:04

## 2021-04-18 RX ADMIN — HEPARIN SODIUM 5000 UNITS: 5000 INJECTION INTRAVENOUS; SUBCUTANEOUS at 05:04

## 2021-04-18 RX ADMIN — HYDROCODONE BITARTRATE AND ACETAMINOPHEN 1 TABLET: 10; 325 TABLET ORAL at 12:04

## 2021-04-18 RX ADMIN — SODIUM CHLORIDE 1 DROP: 50 SOLUTION/ DROPS OPHTHALMIC at 08:04

## 2021-04-19 ENCOUNTER — TELEPHONE (OUTPATIENT)
Dept: NEUROSURGERY | Facility: CLINIC | Age: 76
End: 2021-04-19

## 2021-04-20 ENCOUNTER — PATIENT OUTREACH (OUTPATIENT)
Dept: ADMINISTRATIVE | Facility: CLINIC | Age: 76
End: 2021-04-20

## 2021-04-20 DIAGNOSIS — S22.069A CLOSED FRACTURE OF EIGHTH THORACIC VERTEBRA, UNSPECIFIED FRACTURE MORPHOLOGY, INITIAL ENCOUNTER: Primary | ICD-10-CM

## 2021-04-23 ENCOUNTER — CARE AT HOME (OUTPATIENT)
Dept: HOME HEALTH SERVICES | Facility: CLINIC | Age: 76
End: 2021-04-23
Payer: MEDICARE

## 2021-04-23 DIAGNOSIS — S22.069A CLOSED FRACTURE OF EIGHTH THORACIC VERTEBRA, UNSPECIFIED FRACTURE MORPHOLOGY, INITIAL ENCOUNTER: ICD-10-CM

## 2021-04-23 PROCEDURE — 99495 TCM SERVICES (MODERATE COMPLEXITY): ICD-10-PCS | Mod: S$GLB,,, | Performed by: NURSE PRACTITIONER

## 2021-04-23 PROCEDURE — 99495 TRANSJ CARE MGMT MOD F2F 14D: CPT | Mod: S$GLB,,, | Performed by: NURSE PRACTITIONER

## 2021-04-23 PROCEDURE — 99497 PR ADVNCD CARE PLAN 30 MIN: ICD-10-PCS | Mod: 25,S$GLB,, | Performed by: NURSE PRACTITIONER

## 2021-04-23 PROCEDURE — 99497 ADVNCD CARE PLAN 30 MIN: CPT | Mod: 25,S$GLB,, | Performed by: NURSE PRACTITIONER

## 2021-05-03 VITALS
DIASTOLIC BLOOD PRESSURE: 72 MMHG | RESPIRATION RATE: 18 BRPM | TEMPERATURE: 97 F | SYSTOLIC BLOOD PRESSURE: 133 MMHG | HEIGHT: 72 IN | BODY MASS INDEX: 32.23 KG/M2 | OXYGEN SATURATION: 96 % | HEART RATE: 68 BPM | WEIGHT: 238 LBS

## 2021-05-05 ENCOUNTER — EXTERNAL HOME HEALTH (OUTPATIENT)
Dept: HOME HEALTH SERVICES | Facility: HOSPITAL | Age: 76
End: 2021-05-05
Payer: MEDICARE

## 2021-05-07 ENCOUNTER — DOCUMENT SCAN (OUTPATIENT)
Dept: HOME HEALTH SERVICES | Facility: HOSPITAL | Age: 76
End: 2021-05-07
Payer: MEDICARE

## 2021-05-11 ENCOUNTER — TELEPHONE (OUTPATIENT)
Dept: NEUROSURGERY | Facility: CLINIC | Age: 76
End: 2021-05-11

## 2021-05-19 ENCOUNTER — DOCUMENT SCAN (OUTPATIENT)
Dept: HOME HEALTH SERVICES | Facility: HOSPITAL | Age: 76
End: 2021-05-19
Payer: MEDICARE

## 2022-01-13 ENCOUNTER — HOSPITAL ENCOUNTER (INPATIENT)
Facility: HOSPITAL | Age: 77
LOS: 2 days | Discharge: HOME | DRG: 247 | End: 2022-01-15
Attending: EMERGENCY MEDICINE | Admitting: STUDENT IN AN ORGANIZED HEALTH CARE EDUCATION/TRAINING PROGRAM
Payer: MEDICARE

## 2022-01-13 DIAGNOSIS — Z98.61 CAD S/P PERCUTANEOUS CORONARY ANGIOPLASTY: ICD-10-CM

## 2022-01-13 DIAGNOSIS — Z79.4 TYPE 2 DIABETES MELLITUS WITH HYPERGLYCEMIA, WITH LONG-TERM CURRENT USE OF INSULIN: ICD-10-CM

## 2022-01-13 DIAGNOSIS — I21.4 NSTEMI (NON-ST ELEVATION MYOCARDIAL INFARCTION): ICD-10-CM

## 2022-01-13 DIAGNOSIS — E11.65 TYPE 2 DIABETES MELLITUS WITH HYPERGLYCEMIA, WITH LONG-TERM CURRENT USE OF INSULIN: ICD-10-CM

## 2022-01-13 DIAGNOSIS — R07.9 CHEST PAIN: ICD-10-CM

## 2022-01-13 DIAGNOSIS — I21.4 NSTEMI (NON-ST ELEVATED MYOCARDIAL INFARCTION): ICD-10-CM

## 2022-01-13 DIAGNOSIS — I21.4 NON-ST ELEVATION MYOCARDIAL INFARCTION (NSTEMI): ICD-10-CM

## 2022-01-13 DIAGNOSIS — I25.10 CAD S/P PERCUTANEOUS CORONARY ANGIOPLASTY: ICD-10-CM

## 2022-01-13 DIAGNOSIS — I10 PRIMARY HYPERTENSION: ICD-10-CM

## 2022-01-13 DIAGNOSIS — E78.5 HYPERLIPIDEMIA ASSOCIATED WITH TYPE 2 DIABETES MELLITUS: ICD-10-CM

## 2022-01-13 DIAGNOSIS — E11.69 HYPERLIPIDEMIA ASSOCIATED WITH TYPE 2 DIABETES MELLITUS: ICD-10-CM

## 2022-01-13 LAB
ALBUMIN SERPL BCP-MCNC: 3.6 G/DL (ref 3.5–5.2)
ALP SERPL-CCNC: 99 U/L (ref 55–135)
ALT SERPL W/O P-5'-P-CCNC: 10 U/L (ref 10–44)
ANION GAP SERPL CALC-SCNC: 11 MMOL/L (ref 8–16)
APTT BLDCRRT: <21 SEC (ref 21–32)
AST SERPL-CCNC: 23 U/L (ref 10–40)
BASOPHILS # BLD AUTO: 0.04 K/UL (ref 0–0.2)
BASOPHILS # BLD AUTO: 0.04 K/UL (ref 0–0.2)
BASOPHILS NFR BLD: 0.3 % (ref 0–1.9)
BASOPHILS NFR BLD: 0.3 % (ref 0–1.9)
BILIRUB SERPL-MCNC: 0.6 MG/DL (ref 0.1–1)
BNP SERPL-MCNC: 16 PG/ML (ref 0–99)
BUN SERPL-MCNC: 11 MG/DL (ref 8–23)
CALCIUM SERPL-MCNC: 9.3 MG/DL (ref 8.7–10.5)
CHLORIDE SERPL-SCNC: 101 MMOL/L (ref 95–110)
CO2 SERPL-SCNC: 23 MMOL/L (ref 23–29)
CREAT SERPL-MCNC: 1.1 MG/DL (ref 0.5–1.4)
CTP QC/QA: YES
DIFFERENTIAL METHOD: ABNORMAL
DIFFERENTIAL METHOD: ABNORMAL
EOSINOPHIL # BLD AUTO: 0 K/UL (ref 0–0.5)
EOSINOPHIL # BLD AUTO: 0 K/UL (ref 0–0.5)
EOSINOPHIL NFR BLD: 0.1 % (ref 0–8)
EOSINOPHIL NFR BLD: 0.1 % (ref 0–8)
ERYTHROCYTE [DISTWIDTH] IN BLOOD BY AUTOMATED COUNT: 12.2 % (ref 11.5–14.5)
ERYTHROCYTE [DISTWIDTH] IN BLOOD BY AUTOMATED COUNT: 12.3 % (ref 11.5–14.5)
EST. GFR  (AFRICAN AMERICAN): 57.2 ML/MIN/1.73 M^2
EST. GFR  (NON AFRICAN AMERICAN): 49.6 ML/MIN/1.73 M^2
GLUCOSE SERPL-MCNC: 195 MG/DL (ref 70–110)
HCT VFR BLD AUTO: 41.2 % (ref 37–48.5)
HCT VFR BLD AUTO: 42.5 % (ref 37–48.5)
HGB BLD-MCNC: 13.1 G/DL (ref 12–16)
HGB BLD-MCNC: 14 G/DL (ref 12–16)
IMM GRANULOCYTES # BLD AUTO: 0.05 K/UL (ref 0–0.04)
IMM GRANULOCYTES # BLD AUTO: 0.06 K/UL (ref 0–0.04)
IMM GRANULOCYTES NFR BLD AUTO: 0.4 % (ref 0–0.5)
IMM GRANULOCYTES NFR BLD AUTO: 0.5 % (ref 0–0.5)
INR PPP: 0.9 (ref 0.8–1.2)
LYMPHOCYTES # BLD AUTO: 1.6 K/UL (ref 1–4.8)
LYMPHOCYTES # BLD AUTO: 1.9 K/UL (ref 1–4.8)
LYMPHOCYTES NFR BLD: 14 % (ref 18–48)
LYMPHOCYTES NFR BLD: 16.2 % (ref 18–48)
MCH RBC QN AUTO: 29.6 PG (ref 27–31)
MCH RBC QN AUTO: 30.4 PG (ref 27–31)
MCHC RBC AUTO-ENTMCNC: 31.8 G/DL (ref 32–36)
MCHC RBC AUTO-ENTMCNC: 32.9 G/DL (ref 32–36)
MCV RBC AUTO: 92 FL (ref 82–98)
MCV RBC AUTO: 93 FL (ref 82–98)
MONOCYTES # BLD AUTO: 0.6 K/UL (ref 0.3–1)
MONOCYTES # BLD AUTO: 0.8 K/UL (ref 0.3–1)
MONOCYTES NFR BLD: 5.1 % (ref 4–15)
MONOCYTES NFR BLD: 7 % (ref 4–15)
NEUTROPHILS # BLD AUTO: 9 K/UL (ref 1.8–7.7)
NEUTROPHILS # BLD AUTO: 9.3 K/UL (ref 1.8–7.7)
NEUTROPHILS NFR BLD: 77.9 % (ref 38–73)
NEUTROPHILS NFR BLD: 78.1 % (ref 38–73)
NRBC BLD-RTO: 0 /100 WBC
NRBC BLD-RTO: 0 /100 WBC
PLATELET # BLD AUTO: 252 K/UL (ref 150–450)
PLATELET # BLD AUTO: 276 K/UL (ref 150–450)
PLATELET BLD QL SMEAR: ABNORMAL
PMV BLD AUTO: 9.1 FL (ref 9.2–12.9)
PMV BLD AUTO: 9.1 FL (ref 9.2–12.9)
POTASSIUM SERPL-SCNC: 4.1 MMOL/L (ref 3.5–5.1)
PROT SERPL-MCNC: 7.2 G/DL (ref 6–8.4)
PROTHROMBIN TIME: 10.3 SEC (ref 9–12.5)
RBC # BLD AUTO: 4.43 M/UL (ref 4–5.4)
RBC # BLD AUTO: 4.6 M/UL (ref 4–5.4)
SARS-COV-2 RDRP RESP QL NAA+PROBE: NEGATIVE
SODIUM SERPL-SCNC: 135 MMOL/L (ref 136–145)
TROPONIN I SERPL DL<=0.01 NG/ML-MCNC: 16.25 NG/ML (ref 0–0.03)
TROPONIN I SERPL DL<=0.01 NG/ML-MCNC: 2.9 NG/ML (ref 0–0.03)
WBC # BLD AUTO: 11.53 K/UL (ref 3.9–12.7)
WBC # BLD AUTO: 11.95 K/UL (ref 3.9–12.7)

## 2022-01-13 PROCEDURE — 25000242 PHARM REV CODE 250 ALT 637 W/ HCPCS

## 2022-01-13 PROCEDURE — 85025 COMPLETE CBC W/AUTO DIFF WBC: CPT

## 2022-01-13 PROCEDURE — 12000002 HC ACUTE/MED SURGE SEMI-PRIVATE ROOM

## 2022-01-13 PROCEDURE — 93010 ELECTROCARDIOGRAM REPORT: CPT | Mod: ,,, | Performed by: INTERNAL MEDICINE

## 2022-01-13 PROCEDURE — 99291 CRITICAL CARE FIRST HOUR: CPT | Mod: GC,CS,, | Performed by: EMERGENCY MEDICINE

## 2022-01-13 PROCEDURE — 63600175 PHARM REV CODE 636 W HCPCS

## 2022-01-13 PROCEDURE — 93010 EKG 12-LEAD: ICD-10-PCS | Mod: ,,, | Performed by: INTERNAL MEDICINE

## 2022-01-13 PROCEDURE — 25000003 PHARM REV CODE 250

## 2022-01-13 PROCEDURE — 99223 PR INITIAL HOSPITAL CARE,LEVL III: ICD-10-PCS | Mod: ,,, | Performed by: PHYSICIAN ASSISTANT

## 2022-01-13 PROCEDURE — 84484 ASSAY OF TROPONIN QUANT: CPT | Mod: 91

## 2022-01-13 PROCEDURE — 85610 PROTHROMBIN TIME: CPT

## 2022-01-13 PROCEDURE — 99291 CRITICAL CARE FIRST HOUR: CPT | Mod: 25

## 2022-01-13 PROCEDURE — 80053 COMPREHEN METABOLIC PANEL: CPT

## 2022-01-13 PROCEDURE — U0002 COVID-19 LAB TEST NON-CDC: HCPCS

## 2022-01-13 PROCEDURE — 99291 PR CRITICAL CARE, E/M 30-74 MINUTES: ICD-10-PCS | Mod: GC,CS,, | Performed by: EMERGENCY MEDICINE

## 2022-01-13 PROCEDURE — 93005 ELECTROCARDIOGRAM TRACING: CPT

## 2022-01-13 PROCEDURE — 85730 THROMBOPLASTIN TIME PARTIAL: CPT

## 2022-01-13 PROCEDURE — 99223 1ST HOSP IP/OBS HIGH 75: CPT | Mod: ,,, | Performed by: PHYSICIAN ASSISTANT

## 2022-01-13 PROCEDURE — 96374 THER/PROPH/DIAG INJ IV PUSH: CPT

## 2022-01-13 PROCEDURE — 83880 ASSAY OF NATRIURETIC PEPTIDE: CPT

## 2022-01-13 RX ORDER — ONDANSETRON 8 MG/1
8 TABLET, ORALLY DISINTEGRATING ORAL EVERY 8 HOURS PRN
Status: DISCONTINUED | OUTPATIENT
Start: 2022-01-14 | End: 2022-01-15 | Stop reason: HOSPADM

## 2022-01-13 RX ORDER — IPRATROPIUM BROMIDE AND ALBUTEROL SULFATE 2.5; .5 MG/3ML; MG/3ML
3 SOLUTION RESPIRATORY (INHALATION) EVERY 4 HOURS PRN
Status: DISCONTINUED | OUTPATIENT
Start: 2022-01-14 | End: 2022-01-15 | Stop reason: HOSPADM

## 2022-01-13 RX ORDER — IBUPROFEN 200 MG
24 TABLET ORAL
Status: DISCONTINUED | OUTPATIENT
Start: 2022-01-14 | End: 2022-01-15 | Stop reason: HOSPADM

## 2022-01-13 RX ORDER — PROMETHAZINE HYDROCHLORIDE 25 MG/1
25 TABLET ORAL EVERY 6 HOURS PRN
Status: DISCONTINUED | OUTPATIENT
Start: 2022-01-14 | End: 2022-01-15 | Stop reason: HOSPADM

## 2022-01-13 RX ORDER — ACETAMINOPHEN 325 MG/1
650 TABLET ORAL EVERY 4 HOURS PRN
Status: DISCONTINUED | OUTPATIENT
Start: 2022-01-14 | End: 2022-01-15 | Stop reason: HOSPADM

## 2022-01-13 RX ORDER — CLOPIDOGREL BISULFATE 75 MG/1
75 TABLET ORAL DAILY
Status: DISCONTINUED | OUTPATIENT
Start: 2022-01-14 | End: 2022-01-15 | Stop reason: HOSPADM

## 2022-01-13 RX ORDER — NITROGLYCERIN 0.4 MG/1
0.4 TABLET SUBLINGUAL
Status: COMPLETED | OUTPATIENT
Start: 2022-01-13 | End: 2022-01-13

## 2022-01-13 RX ORDER — NITROGLYCERIN 0.4 MG/1
0.4 TABLET SUBLINGUAL EVERY 5 MIN PRN
Status: DISCONTINUED | OUTPATIENT
Start: 2022-01-13 | End: 2022-01-13

## 2022-01-13 RX ORDER — METOPROLOL TARTRATE 25 MG/1
25 TABLET, FILM COATED ORAL 2 TIMES DAILY
Status: DISCONTINUED | OUTPATIENT
Start: 2022-01-14 | End: 2022-01-15 | Stop reason: HOSPADM

## 2022-01-13 RX ORDER — CLOPIDOGREL 300 MG/1
300 TABLET, FILM COATED ORAL ONCE
Status: COMPLETED | OUTPATIENT
Start: 2022-01-13 | End: 2022-01-13

## 2022-01-13 RX ORDER — HEPARIN SODIUM,PORCINE/D5W 25000/250
0-40 INTRAVENOUS SOLUTION INTRAVENOUS CONTINUOUS
Status: DISCONTINUED | OUTPATIENT
Start: 2022-01-13 | End: 2022-01-14

## 2022-01-13 RX ORDER — NITROGLYCERIN 0.4 MG/1
0.4 TABLET SUBLINGUAL EVERY 5 MIN PRN
Status: DISCONTINUED | OUTPATIENT
Start: 2022-01-14 | End: 2022-01-15 | Stop reason: HOSPADM

## 2022-01-13 RX ORDER — NITROGLYCERIN 0.4 MG/1
TABLET SUBLINGUAL
Status: COMPLETED
Start: 2022-01-13 | End: 2022-01-13

## 2022-01-13 RX ORDER — SODIUM CHLORIDE 0.9 % (FLUSH) 0.9 %
10 SYRINGE (ML) INJECTION
Status: DISCONTINUED | OUTPATIENT
Start: 2022-01-13 | End: 2022-01-15 | Stop reason: HOSPADM

## 2022-01-13 RX ORDER — GLUCAGON 1 MG
1 KIT INJECTION
Status: DISCONTINUED | OUTPATIENT
Start: 2022-01-14 | End: 2022-01-15 | Stop reason: HOSPADM

## 2022-01-13 RX ORDER — INSULIN ASPART 100 [IU]/ML
0-5 INJECTION, SOLUTION INTRAVENOUS; SUBCUTANEOUS
Status: DISCONTINUED | OUTPATIENT
Start: 2022-01-14 | End: 2022-01-15 | Stop reason: HOSPADM

## 2022-01-13 RX ORDER — TALC
6 POWDER (GRAM) TOPICAL NIGHTLY PRN
Status: DISCONTINUED | OUTPATIENT
Start: 2022-01-13 | End: 2022-01-15 | Stop reason: HOSPADM

## 2022-01-13 RX ORDER — IBUPROFEN 200 MG
16 TABLET ORAL
Status: DISCONTINUED | OUTPATIENT
Start: 2022-01-14 | End: 2022-01-15 | Stop reason: HOSPADM

## 2022-01-13 RX ORDER — NAPROXEN SODIUM 220 MG/1
81 TABLET, FILM COATED ORAL DAILY
Status: DISCONTINUED | OUTPATIENT
Start: 2022-01-14 | End: 2022-01-15 | Stop reason: HOSPADM

## 2022-01-13 RX ORDER — BISACODYL 10 MG
10 SUPPOSITORY, RECTAL RECTAL DAILY PRN
Status: DISCONTINUED | OUTPATIENT
Start: 2022-01-14 | End: 2022-01-15 | Stop reason: HOSPADM

## 2022-01-13 RX ORDER — POLYETHYLENE GLYCOL 3350 17 G/17G
17 POWDER, FOR SOLUTION ORAL DAILY PRN
Status: DISCONTINUED | OUTPATIENT
Start: 2022-01-14 | End: 2022-01-15 | Stop reason: HOSPADM

## 2022-01-13 RX ADMIN — NITROGLYCERIN 1 INCH: 20 OINTMENT TOPICAL at 09:01

## 2022-01-13 RX ADMIN — HEPARIN SODIUM 12 UNITS/KG/HR: 1000 INJECTION, SOLUTION INTRAVENOUS; SUBCUTANEOUS at 10:01

## 2022-01-13 RX ADMIN — CLOPIDOGREL BISULFATE 300 MG: 300 TABLET, FILM COATED ORAL at 09:01

## 2022-01-13 RX ADMIN — NITROGLYCERIN 0.4 MG: 0.4 TABLET SUBLINGUAL at 08:01

## 2022-01-13 RX ADMIN — NITROGLYCERIN 0.4 MG: 0.4 TABLET, ORALLY DISINTEGRATING SUBLINGUAL at 07:01

## 2022-01-13 NOTE — Clinical Note
The catheter was inserted into the and was removed from the ostium   left main. An angiography was performed of the left coronary arteries.

## 2022-01-13 NOTE — Clinical Note
The groin and right radial was prepped. The site was prepped with ChloraPrep. The site was clipped. The patient was draped. The patient was positioned supine.

## 2022-01-13 NOTE — Clinical Note
The DP pulses were 2+ bilaterally. The PT pulses were 2+ bilaterally. The radial pulses were +2 bilaterally.

## 2022-01-13 NOTE — Clinical Note
150 ml of contrast were injected throughout the case. 50 mL of contrast was the total wasted during the case. 200 mL was the total amount used during the case.

## 2022-01-13 NOTE — Clinical Note
The catheter was repositioned into the ostium   right coronary artery. An angiography was performed of the right coronary arteries.

## 2022-01-13 NOTE — Clinical Note
The catheter was inserted into the left ventricle. Hemodynamics were performed.  and Pullback was recorded.

## 2022-01-14 PROBLEM — R47.1 DYSARTHRIA: Status: ACTIVE | Noted: 2022-01-14

## 2022-01-14 LAB
ABO + RH BLD: NORMAL
ANION GAP SERPL CALC-SCNC: 9 MMOL/L (ref 8–16)
APTT BLDCRRT: 31.6 SEC (ref 21–32)
APTT BLDCRRT: 63.1 SEC (ref 21–32)
ASCENDING AORTA: 3.48 CM
AV INDEX (PROSTH): 0.61
AV MEAN GRADIENT: 5 MMHG
AV PEAK GRADIENT: 11 MMHG
AV VALVE AREA: 2.88 CM2
AV VELOCITY RATIO: 0.54
BASOPHILS # BLD AUTO: 0.05 K/UL (ref 0–0.2)
BASOPHILS NFR BLD: 0.4 % (ref 0–1.9)
BLD GP AB SCN CELLS X3 SERPL QL: NORMAL
BUN SERPL-MCNC: 11 MG/DL (ref 8–23)
CALCIUM SERPL-MCNC: 9.2 MG/DL (ref 8.7–10.5)
CHLORIDE SERPL-SCNC: 104 MMOL/L (ref 95–110)
CHOLEST SERPL-MCNC: 152 MG/DL (ref 120–199)
CHOLEST/HDLC SERPL: 2.7 {RATIO} (ref 2–5)
CO2 SERPL-SCNC: 24 MMOL/L (ref 23–29)
CREAT SERPL-MCNC: 1 MG/DL (ref 0.5–1.4)
CV ECHO LV RWT: 0.31 CM
DIFFERENTIAL METHOD: ABNORMAL
DOP CALC AO PEAK VEL: 1.65 M/S
DOP CALC AO VTI: 27.93 CM
DOP CALC LVOT AREA: 4.7 CM2
DOP CALC LVOT DIAMETER: 2.45 CM
DOP CALC LVOT PEAK VEL: 0.89 M/S
DOP CALC LVOT STROKE VOLUME: 80.34 CM3
DOP CALCLVOT PEAK VEL VTI: 17.05 CM
E WAVE DECELERATION TIME: 208.14 MSEC
E/A RATIO: 0.5
E/E' RATIO: 12.29 M/S
ECHO LV POSTERIOR WALL: 0.9 CM (ref 0.6–1.1)
EJECTION FRACTION: 40 %
EOSINOPHIL # BLD AUTO: 0 K/UL (ref 0–0.5)
EOSINOPHIL NFR BLD: 0.3 % (ref 0–8)
ERYTHROCYTE [DISTWIDTH] IN BLOOD BY AUTOMATED COUNT: 12.2 % (ref 11.5–14.5)
EST. GFR  (AFRICAN AMERICAN): >60 ML/MIN/1.73 M^2
EST. GFR  (NON AFRICAN AMERICAN): 55.6 ML/MIN/1.73 M^2
ESTIMATED AVG GLUCOSE: 212 MG/DL (ref 68–131)
FRACTIONAL SHORTENING: 40 % (ref 28–44)
GLUCOSE SERPL-MCNC: 226 MG/DL (ref 70–110)
HBA1C MFR BLD: 9 % (ref 4–5.6)
HCT VFR BLD AUTO: 42.2 % (ref 37–48.5)
HDLC SERPL-MCNC: 57 MG/DL (ref 40–75)
HDLC SERPL: 37.5 % (ref 20–50)
HGB BLD-MCNC: 13.4 G/DL (ref 12–16)
IMM GRANULOCYTES # BLD AUTO: 0.06 K/UL (ref 0–0.04)
IMM GRANULOCYTES NFR BLD AUTO: 0.5 % (ref 0–0.5)
INTERVENTRICULAR SEPTUM: 0.9 CM (ref 0.6–1.1)
LA MAJOR: 6.31 CM
LA MINOR: 5.5 CM
LA WIDTH: 3.59 CM
LDLC SERPL CALC-MCNC: 72.8 MG/DL (ref 63–159)
LEFT ATRIUM SIZE: 3.5 CM
LEFT ATRIUM VOLUME MOD: 63 CM3
LEFT ATRIUM VOLUME: 62.77 CM3
LEFT INTERNAL DIMENSION IN SYSTOLE: 3.47 CM (ref 2.1–4)
LEFT VENTRICLE DIASTOLIC VOLUME: 153.21 ML
LEFT VENTRICLE SYSTOLIC VOLUME: 49.74 ML
LEFT VENTRICULAR INTERNAL DIMENSION IN DIASTOLE: 5.8 CM (ref 3.5–6)
LEFT VENTRICULAR MASS: 203.49 G
LV LATERAL E/E' RATIO: 14.33 M/S
LV SEPTAL E/E' RATIO: 10.75 M/S
LYMPHOCYTES # BLD AUTO: 3.6 K/UL (ref 1–4.8)
LYMPHOCYTES NFR BLD: 29.5 % (ref 18–48)
MCH RBC QN AUTO: 29.9 PG (ref 27–31)
MCHC RBC AUTO-ENTMCNC: 31.8 G/DL (ref 32–36)
MCV RBC AUTO: 94 FL (ref 82–98)
MONOCYTES # BLD AUTO: 0.8 K/UL (ref 0.3–1)
MONOCYTES NFR BLD: 6.5 % (ref 4–15)
MV PEAK A VEL: 0.86 M/S
MV PEAK E VEL: 0.43 M/S
MV STENOSIS PRESSURE HALF TIME: 60.36 MS
MV VALVE AREA P 1/2 METHOD: 3.64 CM2
NEUTROPHILS # BLD AUTO: 7.6 K/UL (ref 1.8–7.7)
NEUTROPHILS NFR BLD: 62.8 % (ref 38–73)
NONHDLC SERPL-MCNC: 95 MG/DL
NRBC BLD-RTO: 0 /100 WBC
PLATELET # BLD AUTO: 269 K/UL (ref 150–450)
PMV BLD AUTO: 9.2 FL (ref 9.2–12.9)
POCT GLUCOSE: 208 MG/DL (ref 70–110)
POCT GLUCOSE: 216 MG/DL (ref 70–110)
POCT GLUCOSE: 224 MG/DL (ref 70–110)
POCT GLUCOSE: 244 MG/DL (ref 70–110)
POCT GLUCOSE: 268 MG/DL (ref 70–110)
POTASSIUM SERPL-SCNC: 4 MMOL/L (ref 3.5–5.1)
PULM VEIN S/D RATIO: 1.34
PV PEAK D VEL: 0.5 M/S
PV PEAK S VEL: 0.67 M/S
RA MAJOR: 4.75 CM
RA PRESSURE: 3 MMHG
RA WIDTH: 3 CM
RBC # BLD AUTO: 4.48 M/UL (ref 4–5.4)
RIGHT VENTRICULAR END-DIASTOLIC DIMENSION: 2.75 CM
RV TISSUE DOPPLER FREE WALL SYSTOLIC VELOCITY 1 (APICAL 4 CHAMBER VIEW): 10.42 CM/S
SINUS: 3.73 CM
SODIUM SERPL-SCNC: 137 MMOL/L (ref 136–145)
STJ: 2.98 CM
TDI LATERAL: 0.03 M/S
TDI SEPTAL: 0.04 M/S
TDI: 0.04 M/S
TRICUSPID ANNULAR PLANE SYSTOLIC EXCURSION: 1.15 CM
TRIGL SERPL-MCNC: 111 MG/DL (ref 30–150)
TROPONIN I SERPL DL<=0.01 NG/ML-MCNC: 23.69 NG/ML (ref 0–0.03)
TROPONIN I SERPL DL<=0.01 NG/ML-MCNC: 23.86 NG/ML (ref 0–0.03)
WBC # BLD AUTO: 12.08 K/UL (ref 3.9–12.7)

## 2022-01-14 PROCEDURE — 25000003 PHARM REV CODE 250: Performed by: INTERNAL MEDICINE

## 2022-01-14 PROCEDURE — 93458 PR CATH PLACE/CORON ANGIO, IMG SUPER/INTERP,W LEFT HEART VENTRICULOGRAPHY: ICD-10-PCS | Mod: 26,51,59,GC | Performed by: INTERNAL MEDICINE

## 2022-01-14 PROCEDURE — C1887 CATHETER, GUIDING: HCPCS | Performed by: INTERNAL MEDICINE

## 2022-01-14 PROCEDURE — 85025 COMPLETE CBC W/AUTO DIFF WBC: CPT

## 2022-01-14 PROCEDURE — 27201423 OPTIME MED/SURG SUP & DEVICES STERILE SUPPLY: Performed by: INTERNAL MEDICINE

## 2022-01-14 PROCEDURE — 63600175 PHARM REV CODE 636 W HCPCS: Performed by: INTERNAL MEDICINE

## 2022-01-14 PROCEDURE — 92978 ENDOLUMINL IVUS OCT C 1ST: CPT | Mod: LD | Performed by: INTERNAL MEDICINE

## 2022-01-14 PROCEDURE — 92978 PR IVUS, CORONARY, 1ST VESSEL: ICD-10-PCS | Mod: 26,LD,GC, | Performed by: INTERNAL MEDICINE

## 2022-01-14 PROCEDURE — 92978 ENDOLUMINL IVUS OCT C 1ST: CPT | Mod: 26,LD,GC, | Performed by: INTERNAL MEDICINE

## 2022-01-14 PROCEDURE — C1894 INTRO/SHEATH, NON-LASER: HCPCS | Performed by: INTERNAL MEDICINE

## 2022-01-14 PROCEDURE — 86901 BLOOD TYPING SEROLOGIC RH(D): CPT | Performed by: PHYSICIAN ASSISTANT

## 2022-01-14 PROCEDURE — C1725 CATH, TRANSLUMIN NON-LASER: HCPCS | Performed by: INTERNAL MEDICINE

## 2022-01-14 PROCEDURE — 36415 COLL VENOUS BLD VENIPUNCTURE: CPT

## 2022-01-14 PROCEDURE — C1769 GUIDE WIRE: HCPCS | Performed by: INTERNAL MEDICINE

## 2022-01-14 PROCEDURE — 99223 1ST HOSP IP/OBS HIGH 75: CPT | Mod: 25,GC,, | Performed by: INTERNAL MEDICINE

## 2022-01-14 PROCEDURE — 84484 ASSAY OF TROPONIN QUANT: CPT | Performed by: PHYSICIAN ASSISTANT

## 2022-01-14 PROCEDURE — C9399 UNCLASSIFIED DRUGS OR BIOLOG: HCPCS | Performed by: PHYSICIAN ASSISTANT

## 2022-01-14 PROCEDURE — 80048 BASIC METABOLIC PNL TOTAL CA: CPT | Performed by: STUDENT IN AN ORGANIZED HEALTH CARE EDUCATION/TRAINING PROGRAM

## 2022-01-14 PROCEDURE — 36415 COLL VENOUS BLD VENIPUNCTURE: CPT | Performed by: STUDENT IN AN ORGANIZED HEALTH CARE EDUCATION/TRAINING PROGRAM

## 2022-01-14 PROCEDURE — 93458 L HRT ARTERY/VENTRICLE ANGIO: CPT | Mod: 59 | Performed by: INTERNAL MEDICINE

## 2022-01-14 PROCEDURE — 25500020 PHARM REV CODE 255: Performed by: STUDENT IN AN ORGANIZED HEALTH CARE EDUCATION/TRAINING PROGRAM

## 2022-01-14 PROCEDURE — 99223 PR INITIAL HOSPITAL CARE,LEVL III: ICD-10-PCS | Mod: GC,,, | Performed by: INTERNAL MEDICINE

## 2022-01-14 PROCEDURE — 99233 SBSQ HOSP IP/OBS HIGH 50: CPT | Mod: ,,, | Performed by: STUDENT IN AN ORGANIZED HEALTH CARE EDUCATION/TRAINING PROGRAM

## 2022-01-14 PROCEDURE — 80061 LIPID PANEL: CPT | Performed by: PHYSICIAN ASSISTANT

## 2022-01-14 PROCEDURE — C1874 STENT, COATED/COV W/DEL SYS: HCPCS | Performed by: INTERNAL MEDICINE

## 2022-01-14 PROCEDURE — 83735 ASSAY OF MAGNESIUM: CPT | Performed by: STUDENT IN AN ORGANIZED HEALTH CARE EDUCATION/TRAINING PROGRAM

## 2022-01-14 PROCEDURE — 99223 1ST HOSP IP/OBS HIGH 75: CPT | Mod: GC,,, | Performed by: INTERNAL MEDICINE

## 2022-01-14 PROCEDURE — 63600175 PHARM REV CODE 636 W HCPCS: Performed by: PHYSICIAN ASSISTANT

## 2022-01-14 PROCEDURE — 85730 THROMBOPLASTIN TIME PARTIAL: CPT

## 2022-01-14 PROCEDURE — 99223 PR INITIAL HOSPITAL CARE,LEVL III: ICD-10-PCS | Mod: 25,GC,, | Performed by: INTERNAL MEDICINE

## 2022-01-14 PROCEDURE — 93010 ELECTROCARDIOGRAM REPORT: CPT | Mod: 76,,, | Performed by: INTERNAL MEDICINE

## 2022-01-14 PROCEDURE — 93010 EKG 12-LEAD: ICD-10-PCS | Mod: 76,,, | Performed by: INTERNAL MEDICINE

## 2022-01-14 PROCEDURE — 93005 ELECTROCARDIOGRAM TRACING: CPT

## 2022-01-14 PROCEDURE — C1753 CATH, INTRAVAS ULTRASOUND: HCPCS | Performed by: INTERNAL MEDICINE

## 2022-01-14 PROCEDURE — 83036 HEMOGLOBIN GLYCOSYLATED A1C: CPT | Performed by: PHYSICIAN ASSISTANT

## 2022-01-14 PROCEDURE — 99152 MOD SED SAME PHYS/QHP 5/>YRS: CPT | Performed by: INTERNAL MEDICINE

## 2022-01-14 PROCEDURE — 93010 ELECTROCARDIOGRAM REPORT: CPT | Mod: ,,, | Performed by: INTERNAL MEDICINE

## 2022-01-14 PROCEDURE — 99152 PR MOD CONSCIOUS SEDATION, SAME PHYS, 5+ YRS, FIRST 15 MIN: ICD-10-PCS | Mod: GC,,, | Performed by: INTERNAL MEDICINE

## 2022-01-14 PROCEDURE — 85347 COAGULATION TIME ACTIVATED: CPT | Performed by: INTERNAL MEDICINE

## 2022-01-14 PROCEDURE — 99233 PR SUBSEQUENT HOSPITAL CARE,LEVL III: ICD-10-PCS | Mod: ,,, | Performed by: STUDENT IN AN ORGANIZED HEALTH CARE EDUCATION/TRAINING PROGRAM

## 2022-01-14 PROCEDURE — 93458 L HRT ARTERY/VENTRICLE ANGIO: CPT | Mod: 26,51,59,GC | Performed by: INTERNAL MEDICINE

## 2022-01-14 PROCEDURE — 25000003 PHARM REV CODE 250: Performed by: PHYSICIAN ASSISTANT

## 2022-01-14 PROCEDURE — 20600001 HC STEP DOWN PRIVATE ROOM

## 2022-01-14 PROCEDURE — 99152 MOD SED SAME PHYS/QHP 5/>YRS: CPT | Mod: GC,,, | Performed by: INTERNAL MEDICINE

## 2022-01-14 PROCEDURE — 92928 PRQ TCAT PLMT NTRAC ST 1 LES: CPT | Mod: LD,GC,, | Performed by: INTERNAL MEDICINE

## 2022-01-14 PROCEDURE — 92928 PR STENT: ICD-10-PCS | Mod: LD,GC,, | Performed by: INTERNAL MEDICINE

## 2022-01-14 PROCEDURE — 99153 MOD SED SAME PHYS/QHP EA: CPT | Performed by: INTERNAL MEDICINE

## 2022-01-14 PROCEDURE — C9600 PERC DRUG-EL COR STENT SING: HCPCS | Mod: LD | Performed by: INTERNAL MEDICINE

## 2022-01-14 PROCEDURE — 85730 THROMBOPLASTIN TIME PARTIAL: CPT | Mod: 91 | Performed by: STUDENT IN AN ORGANIZED HEALTH CARE EDUCATION/TRAINING PROGRAM

## 2022-01-14 DEVICE — STENT RONYX25018UX RESOLUTE ONYX 2.50X18
Type: IMPLANTABLE DEVICE | Site: CORONARY | Status: FUNCTIONAL
Brand: RESOLUTE ONYX™

## 2022-01-14 RX ORDER — DIPHENHYDRAMINE HCL 50 MG
50 CAPSULE ORAL ONCE
Status: COMPLETED | OUTPATIENT
Start: 2022-01-14 | End: 2022-01-14

## 2022-01-14 RX ORDER — MIDAZOLAM HYDROCHLORIDE 1 MG/ML
INJECTION, SOLUTION INTRAMUSCULAR; INTRAVENOUS
Status: DISCONTINUED | OUTPATIENT
Start: 2022-01-14 | End: 2022-01-15 | Stop reason: HOSPADM

## 2022-01-14 RX ORDER — LIDOCAINE HYDROCHLORIDE 20 MG/ML
INJECTION, SOLUTION INFILTRATION; PERINEURAL
Status: DISCONTINUED | OUTPATIENT
Start: 2022-01-14 | End: 2022-01-15 | Stop reason: HOSPADM

## 2022-01-14 RX ORDER — INSULIN ASPART 100 [IU]/ML
8 INJECTION, SOLUTION INTRAVENOUS; SUBCUTANEOUS
Status: DISCONTINUED | OUTPATIENT
Start: 2022-01-14 | End: 2022-01-15 | Stop reason: HOSPADM

## 2022-01-14 RX ORDER — HEPARIN SOD,PORCINE/0.9 % NACL 1000/500ML
INTRAVENOUS SOLUTION INTRAVENOUS
Status: DISCONTINUED | OUTPATIENT
Start: 2022-01-14 | End: 2022-01-15 | Stop reason: HOSPADM

## 2022-01-14 RX ORDER — HEPARIN SODIUM 1000 [USP'U]/ML
INJECTION, SOLUTION INTRAVENOUS; SUBCUTANEOUS
Status: DISCONTINUED | OUTPATIENT
Start: 2022-01-14 | End: 2022-01-15 | Stop reason: HOSPADM

## 2022-01-14 RX ORDER — FENTANYL CITRATE 50 UG/ML
INJECTION, SOLUTION INTRAMUSCULAR; INTRAVENOUS
Status: DISCONTINUED | OUTPATIENT
Start: 2022-01-14 | End: 2022-01-14

## 2022-01-14 RX ORDER — SODIUM CHLORIDE 9 MG/ML
INJECTION, SOLUTION INTRAVENOUS CONTINUOUS
Status: ACTIVE | OUTPATIENT
Start: 2022-01-14 | End: 2022-01-14

## 2022-01-14 RX ORDER — ATORVASTATIN CALCIUM 20 MG/1
80 TABLET, FILM COATED ORAL NIGHTLY
Status: DISCONTINUED | OUTPATIENT
Start: 2022-01-14 | End: 2022-01-15 | Stop reason: HOSPADM

## 2022-01-14 RX ADMIN — ATORVASTATIN CALCIUM 80 MG: 20 TABLET, FILM COATED ORAL at 02:01

## 2022-01-14 RX ADMIN — CLOPIDOGREL 75 MG: 75 TABLET, FILM COATED ORAL at 08:01

## 2022-01-14 RX ADMIN — INSULIN ASPART 1 UNITS: 100 INJECTION, SOLUTION INTRAVENOUS; SUBCUTANEOUS at 08:01

## 2022-01-14 RX ADMIN — INSULIN ASPART 1 UNITS: 100 INJECTION, SOLUTION INTRAVENOUS; SUBCUTANEOUS at 02:01

## 2022-01-14 RX ADMIN — INSULIN ASPART 8 UNITS: 100 INJECTION, SOLUTION INTRAVENOUS; SUBCUTANEOUS at 05:01

## 2022-01-14 RX ADMIN — ATORVASTATIN CALCIUM 80 MG: 20 TABLET, FILM COATED ORAL at 08:01

## 2022-01-14 RX ADMIN — INSULIN ASPART 8 UNITS: 100 INJECTION, SOLUTION INTRAVENOUS; SUBCUTANEOUS at 01:01

## 2022-01-14 RX ADMIN — INSULIN DETEMIR 25 UNITS: 100 INJECTION, SOLUTION SUBCUTANEOUS at 08:01

## 2022-01-14 RX ADMIN — HUMAN ALBUMIN MICROSPHERES AND PERFLUTREN 0.11 MG: 10; .22 INJECTION, SOLUTION INTRAVENOUS at 09:01

## 2022-01-14 RX ADMIN — SODIUM CHLORIDE: 0.9 INJECTION, SOLUTION INTRAVENOUS at 07:01

## 2022-01-14 RX ADMIN — INSULIN ASPART 2 UNITS: 100 INJECTION, SOLUTION INTRAVENOUS; SUBCUTANEOUS at 01:01

## 2022-01-14 RX ADMIN — DIPHENHYDRAMINE HYDROCHLORIDE 50 MG: 50 CAPSULE ORAL at 08:01

## 2022-01-14 RX ADMIN — LOSARTAN POTASSIUM 12.5 MG: 25 TABLET, FILM COATED ORAL at 09:01

## 2022-01-14 RX ADMIN — INSULIN DETEMIR 25 UNITS: 100 INJECTION, SOLUTION SUBCUTANEOUS at 03:01

## 2022-01-14 RX ADMIN — METOPROLOL TARTRATE 25 MG: 25 TABLET, FILM COATED ORAL at 08:01

## 2022-01-14 RX ADMIN — INSULIN ASPART 2 UNITS: 100 INJECTION, SOLUTION INTRAVENOUS; SUBCUTANEOUS at 05:01

## 2022-01-14 RX ADMIN — ASPIRIN 81 MG CHEWABLE TABLET 81 MG: 81 TABLET CHEWABLE at 08:01

## 2022-01-14 NOTE — PROGRESS NOTES
RD flagged for A1C 9. Pt RENZO at time of visit, diabetic diet handout left at bedside.   RD to monitor and provide diet education at f/u.

## 2022-01-14 NOTE — ED NOTES
Patient arrives by EMS and states she is a type 2 diabetic and has been having left arm pain and chest pain today. Patient states she has never had an MI but was scared and wanted to come get checked. Patient states she checked her sugar at home and it was in the 300's and states she took 25 units of novolog.

## 2022-01-14 NOTE — PROGRESS NOTES
"[facility] Medicine  Progress Note    Patient Name: Debbie Metcalf  MRN: [id number]  Patient Class: IP- Inpatient   Admission Date: 1/13/2022  Length of Stay: 1 days  Attending Physician: Reno Souza*  Primary Care Provider: MEÑO Abdalla        Subjective:     Principal Problem:NSTEMI (non-ST elevated myocardial infarction)        HPI:  Debbie Metcalf is a 76 y.o. female with a PMHx of HTN, HLD, T2DM, diabetic neuropathy who is being admitted to hospital medicine for NSTEMI. Patient presented to [facility] [place] with complaints of acute onset left sided "dull, achy" chest pain with associated radiation to her left shoulder/arm that woke her up from a nap around 2:30pm today. The pain worsened with exertion and minimally improved with rest. She had associated numbness/tingling to her bilateral hands/ finger tips. Given multiple SL NTG in the ED with minimal improvement in symptoms, chest pain later resolved after nitro patch placed. Patient also reports intermittent slurred speech and word finding difficulty for the past 3 months and feels like she may be having recurrent mini strokes, last episode occurred a few minutes prior to my exam while in the ED. No associated facial droop or unilateral weakness. She denies fever, chills, diaphoresis, N/V, abdominal pain, SOB, HA, vision changes, or syncope.       Overview/Hospital Course:  In the ED, patient was hypertensive to max /90, remaining vitals stable. BNP 16, Troponin 2.9>>16.249. EKG with left anterior fascicular block but no acute STEMI. Cardiology consulted and recommend starting ACS protocol for NSEMI. Given ASA 324mg and Plavix 300mg x1. She was admitted to hospital medicine. Interventional cardiology was consulted and patient was taken to cath lab.       Interval History: To cath lab today. Denies chest pain.     Review of Systems   Constitutional: Negative for appetite change, chills and fever. "   Respiratory: Negative for cough, chest tightness and shortness of breath.    Cardiovascular: Negative for chest pain and leg swelling.   Gastrointestinal: Negative for abdominal distention, abdominal pain, nausea and vomiting.   Musculoskeletal: Negative for arthralgias and myalgias.   Skin: Negative for color change and pallor.   Neurological: Negative for dizziness and light-headedness.   Psychiatric/Behavioral: Negative for agitation and confusion.     Objective:     Vital Signs (Most Recent):  Temp: 97.4 °F (36.3 °C) (01/14/22 1135)  Pulse: 71 (01/14/22 1200)  Resp: 20 (01/14/22 0744)  BP: 132/68 (01/14/22 1200)  SpO2: 98 % (01/14/22 1200) Vital Signs (24h Range):  Temp:  [97.4 °F (36.3 °C)-98 °F (36.7 °C)] 97.4 °F (36.3 °C)  Pulse:  [66-94] 71  Resp:  [16-22] 20  SpO2:  [95 %-100 %] 98 %  BP: ()/(59-90) 132/68     Weight: 101.6 kg (223 lb 15.8 oz)  Body mass index is 29.55 kg/m².    Intake/Output Summary (Last 24 hours) at 1/14/2022 1601  Last data filed at 1/14/2022 0600  Gross per 24 hour   Intake --   Output 400 ml   Net -400 ml      Physical Exam  Constitutional:       General: She is not in acute distress.     Appearance: Normal appearance. She is not toxic-appearing or diaphoretic.   Cardiovascular:      Rate and Rhythm: Normal rate and regular rhythm.      Heart sounds: No murmur heard.  No friction rub. No gallop.    Pulmonary:      Effort: Pulmonary effort is normal. No respiratory distress.      Breath sounds: Normal breath sounds. No wheezing or rales.   Abdominal:      General: Abdomen is flat. There is no distension.      Palpations: Abdomen is soft.      Tenderness: There is no abdominal tenderness. There is no guarding or rebound.   Musculoskeletal:      Right lower leg: No edema.      Left lower leg: No edema.   Neurological:      Mental Status: She is alert.         MELD-Na score: 6 at 1/14/2022 11:56 AM  MELD score: 6 at 1/14/2022 11:56 AM  Calculated from:  Serum Creatinine: 1.0 mg/dL  at 1/14/2022 11:56 AM  Serum Sodium: 137 mmol/L at 1/14/2022 11:56 AM  Total Bilirubin: 0.6 mg/dL (Using min of 1 mg/dL) at 1/13/2022  7:12 PM  INR(ratio): 0.9 (Using min of 1) at 1/13/2022  9:50 PM  Age: 76 years    Significant Labs:  CBC:  Recent Labs   Lab 01/13/22 1912 01/13/22 2150 01/14/22  0420   WBC 11.53 11.95 12.08   HGB 14.0 13.1 13.4   HCT 42.5 41.2 42.2    252 269     CMP:  Recent Labs   Lab 01/13/22 1912 01/14/22  1156   * 137   K 4.1 4.0    104   CO2 23 24   * 226*   BUN 11 11   CREATININE 1.1 1.0   CALCIUM 9.3 9.2   PROT 7.2  --    ALBUMIN 3.6  --    BILITOT 0.6  --    ALKPHOS 99  --    AST 23  --    ALT 10  --    ANIONGAP 11 9   EGFRNONAA 49.6* 55.6*     PTINR:  Recent Labs   Lab 01/13/22 2150   INR 0.9       Significant Procedures:   Dobutamine Stress Test with Color Flow: No results found for this or any previous visit.        Assessment/Plan:      * NSTEMI (non-ST elevated myocardial infarction)  - NSTEMI/UA Pathway initiated  - prn NTG for chest pain  - telemetry   - Interventional cards consulted, s/p [facility]  - continue atorvastatin, Metoprolol and Losartan, DAPT 1 year    Dysarthria  - reports intermittent slurred speech/ word finding difficulty x3 months  - CTH with remote lacunar type infarcts in the left external capsule, left parietal lobe, and right basal ganglia, no acute process     Hyperlipidemia associated with type 2 diabetes mellitus  - lipid panel in AM  - increase atorvastatin from 20 to 80mg daily    Hypertension  - continue spironolactone 25mg daily  - add MTP 25mg BID and Losartan 12.5mg daily; up titrate for goal HR<60 and SBP<130    Type 2 diabetes mellitus, with long-term current use of insulin  - BG goal 140 - 180   - home regimen: levemir 90U qhs + 20U novolog TIDWM  - inpatient regimen: detemir 25U BID + aspart 8U TIDWM + LDSSI  - ACHS accuchecks  - repeat A1c      VTE Risk Mitigation (From admission, onward)         Ordered     heparin  (porcine) injection  As needed (PRN)         01/14/22 0914     heparin (porcine) 750 Units, verapamiL 1.25 mg, nitroGLYCERIN 1.25 mg in sodium chloride 0.9% 250 mL  As needed (PRN)         01/14/22 0857     heparin infusion 1,000 units/500 ml in 0.9% NaCl (on sterile field)  As needed (PRN)         01/14/22 0857     Reason for No Pharmacological VTE Prophylaxis  Once        Question:  Reasons:  Answer:  IV Heparin w/in 24 hrs. Pre or Post-Op    01/13/22 2311     IP VTE HIGH RISK PATIENT  Once         01/13/22 2311     Place sequential compression device  Until discontinued         01/13/22 6201                Discharge Planning   JESSICA: 1/16/2022     Code Status: Full Code   Is the patient medically ready for discharge?: No    Reason for patient still in hospital (select all that apply): Patient trending condition and Consult recommendations  Discharge Plan A: Home        Reno Bull MD  Department of Hospital Medicine   [facility] - Cardiology Stepdown

## 2022-01-14 NOTE — HPI
Patient is a 76 year old female with history of T2DM (01/14/2022 Hgb a1c 9%), diabetic neuropathy, HTN, HLD (LDL 72 mg /dL on atorvastatin 20 mg PO at home), chronic back pain 2/2 scoliosis who presented to the ED on 01/13/2022 with left arm pain radiating to substernal chest discomfort; reports being woken up from afternoon nap ~ 2:30 bpm with acute onset pain starting in her left arm and radiating to her left shoulder and left chest. She rated the pain as 8/10, substernal/borderline left breast wall, sudden onset after lunch with a friend. Associated with tingling in her left arm. Blood glucose was 360 mg/dL (baseline 180-200 mg/dL) and she took 25U insulin. Her blood glucose came down but her pain remained and she called EMS to bring to the ER. Enroute, she was given SL NTG and aspirin, which helped alleviate the pain.       Patient currently denies any chest discomfort, jaw pain, arm pain, shortness of breath. No prior history of chest pain. Does not exercise regularly but reports dyspnea on exertion for the last 2 months with walking up flights of stairs or running after grandchildren. Started developing fluid in legs bilaterally 2 years ago for which she was started on aldactone. No known prior TTE.     In the ED, /90, HR 88 bpm, sating 99% on RA. CBC wnl, CMP wnl. CXR wnl. ECG notable for sinus rhythm, 1st AVB, Q-waves septal and lateral leads. Cr 1.1. Hgb 13.4 g/dL Plt 269 k cells/uL. No prior ECGs available for comparison. On bedside Echo by overnight fellow, LVEF appeared to be 55-60% with no WMA. RV wnl. Troponin trend 2.9 --> 16.25 --> 23.8. CT head without contrast obtained by primary over concerns regarding dysarthric speech, remote lacunar infarcts. Patient was loaded with  mg PO x 1 by EMS and Plavix 300 mg PO x 1 in ED.      No reported family history of premature CAD.   Denies tobacco use, alcohol or other drug use. Worked as a  prior to custodial. Lives in [place]. Has 3  children. Lives alone. Ambulates with cane due to recent thoracic vertebral injury.

## 2022-01-14 NOTE — ASSESSMENT & PLAN NOTE
- reports intermittent slurred speech/ word finding difficulty x3 months  - CTH with remote lacunar type infarcts in the left external capsule, left parietal lobe, and right basal ganglia, no acute process

## 2022-01-14 NOTE — ASSESSMENT & PLAN NOTE
Recommend optimizing management to reduce cardiovascular complication risks  Management per primary

## 2022-01-14 NOTE — ASSESSMENT & PLAN NOTE
- NSTEMI/UA Pathway initiated  - EKG without acute STEMI  - Trop 2.9>>16.3>> trend until peak  - s/p ASA 324mg and Plavix 300mg x1  - continue ACS protocol with heparin gtt, ASA, and Plavix  - check 2D echo  - lipid panel, HbA1c in AM  - continue atorvastatin, start Metoprolol and Losartan  - prn NTG for chest pain  - telemetry   - Cardiology/Interventional Cards consulted; appreciate assistance   - NPO at midnight for possible LHC

## 2022-01-14 NOTE — ASSESSMENT & PLAN NOTE
- Patient presents with NSTEMI, NILDA 116, CHEIKH 3 pts, currently hemodynamically stable and chest pain free, admited to Hospital Medicine, and started on heparin/DAPT/high intensity statin. Patient initially with pain in left arm, shoulder that radiated to central chest. Patient improved with SL nitro. Patient taken by interventional radiology with NITISH placement to LAD. Patient tolerated procedure well and is chest pain free afterwards.    Plan/recs:  -Continue DAPT and continue for at least 1 year  - Continue heparin gtt 48 hours total duration  - Continue Metoprolol Tartrate 25 mg BID  - Continue atorva 80mg daily  - Continue low dose ARB (losartan 12.5) and uptitrate anti-hypertensives as tolerated for target SBP <130  - follow up formal ECHO to assess LV/RV function  - serial ECG as needed for chest pain  -Recommend Cardiology follow up outpatient and cardiac rehab.  -Recommend patient follow up with Endocrinology for better diabetes control.  -Cardiology will sign off at this time, please reach out for additional questions/concerns.

## 2022-01-14 NOTE — HOSPITAL COURSE
In the ED, patient was hypertensive to max /90, remaining vitals stable. BNP 16, Troponin 2.9>>16.249. EKG with left anterior fascicular block but no acute STEMI. Cardiology consulted and recommend starting ACS protocol for NSEMI. Given ASA 324mg and Plavix 300mg x1 and heparin gtt. She was admitted to hospital medicine. Interventional cardiology was consulted and patient was taken to cath lab. She underwent coronary angiogram with NITISH placed mLAD. Afterward she was chest pain free. Referral placed to cardiac rehab. She was discharged.

## 2022-01-14 NOTE — SUBJECTIVE & OBJECTIVE
Past Medical History:   Diagnosis Date    Diabetes mellitus        Past Surgical History:   Procedure Laterality Date    EYE SURGERY      gender reaffirmation         Review of patient's allergies indicates:  No Known Allergies    PTA Medications   Medication Sig    atorvastatin (LIPITOR) 20 MG tablet Take 20 mg by mouth once daily.    estradioL (ESTRACE) 2 MG tablet Take 2 mg by mouth 2 (two) times a day.    insulin aspart U-100 (NOVOLOG) 100 unit/mL injection Inject into the skin 3 (three) times daily before meals.    insulin detemir U-100 (LEVEMIR) 100 unit/mL injection Inject 100 Units into the skin every evening.    LANCETS MISC by Misc.(Non-Drug; Combo Route) route.    minoxidil (ROGAINE TOP) Apply topically.    sodium chloride 5% (SAKINA 128) 5 % ophthalmic solution Place 1 drop into both eyes 3 (three) times daily.    spironolactone (ALDACTONE) 25 MG tablet Take 25 mg by mouth once daily.     Family History     Problem Relation (Age of Onset)    Diabetes Father    Lung cancer Mother    Stroke Father        Tobacco Use    Smoking status: Never Smoker    Smokeless tobacco: Never Used   Substance and Sexual Activity    Alcohol use: Never    Drug use: Never    Sexual activity: Not on file     Review of Systems   Constitutional: Negative for chills and fever.   Cardiovascular: Negative for chest pain, orthopnea and palpitations.   Respiratory: Negative for cough and shortness of breath.    Gastrointestinal: Negative for abdominal pain.   Neurological: Negative for dizziness, headaches and light-headedness.   Psychiatric/Behavioral: Negative for altered mental status.     Objective:     Vital Signs (Most Recent):  Temp: 98 °F (36.7 °C) (01/14/22 0412)  Pulse: 76 (01/14/22 0412)  Resp: 16 (01/14/22 0412)  BP: (!) 154/81 (01/14/22 0412)  SpO2: 97 % (01/14/22 0412) Vital Signs (24h Range):  Temp:  [97.5 °F (36.4 °C)-98 °F (36.7 °C)] 98 °F (36.7 °C)  Pulse:  [76-94] 76  Resp:  [16-22] 16  SpO2:  [95  %-100 %] 97 %  BP: (134-182)/(75-90) 154/81     Weight: 103 kg (227 lb)  Body mass index is 29.95 kg/m².    SpO2: 97 %  O2 Device (Oxygen Therapy): room air    No intake or output data in the 24 hours ending 01/14/22 0549    Lines/Drains/Airways     Peripheral Intravenous Line                 Peripheral IV - Single Lumen 01/13/22 1910 20 G Right Antecubital <1 day                Physical Exam  Vitals reviewed.   Constitutional:       General: She is not in acute distress.     Appearance: Normal appearance. She is not toxic-appearing.      Comments: overweight   HENT:      Head: Normocephalic and atraumatic.      Mouth/Throat:      Mouth: Mucous membranes are moist.   Cardiovascular:      Rate and Rhythm: Normal rate and regular rhythm.      Heart sounds: No murmur heard.  No friction rub. No gallop.       Comments: Radial pulses 2+ bilaterally  DP pulses 2+ bilaterally  Pulmonary:      Effort: Pulmonary effort is normal. No respiratory distress.      Breath sounds: Normal breath sounds.   Abdominal:      Palpations: Abdomen is soft.   Musculoskeletal:      Right lower leg: No edema.      Left lower leg: No edema.   Skin:     General: Skin is warm.   Neurological:      Mental Status: She is alert and oriented to person, place, and time. Mental status is at baseline.       Significant Labs:   BMP:   Recent Labs   Lab 01/13/22 1912   *   *   K 4.1      CO2 23   BUN 11   CREATININE 1.1   CALCIUM 9.3   , CMP   Recent Labs   Lab 01/13/22 1912   *   K 4.1      CO2 23   *   BUN 11   CREATININE 1.1   CALCIUM 9.3   PROT 7.2   ALBUMIN 3.6   BILITOT 0.6   ALKPHOS 99   AST 23   ALT 10   ANIONGAP 11   ESTGFRAFRICA 57.2*   EGFRNONAA 49.6*   , CBC   Recent Labs   Lab 01/13/22 1912 01/13/22 1912 01/13/22 2150 01/13/22 2150 01/14/22  0420   WBC 11.53  --  11.95  --  12.08   HGB 14.0  --  13.1  --  13.4   HCT 42.5   < > 41.2   < > 42.2     --  252  --  269    < > = values in this  interval not displayed.   , INR   Recent Labs   Lab 01/13/22  2150   INR 0.9   , Troponin   Recent Labs   Lab 01/13/22  1912 01/13/22  2050 01/14/22  0420   TROPONINI 2.900* 16.249* 23.859*  23.691*    and All pertinent lab results from the last 24 hours have been reviewed.    Significant Imaging: Echocardiogram: Transthoracic echo (TTE) complete (Cupid Only): No results found for this or any previous visit. and X-Ray: CXR: X-Ray Chest 1 View (CXR): No results found for this visit on 01/13/22. and X-Ray Chest PA and Lateral (CXR): No results found for this visit on 01/13/22.     ECG 01/13/2022 and 01/14/2022: Sinus rhythm with 1st degree AV block, negative concordance in inferior leads, left axis deviation; negative concordance throughout precordial leads

## 2022-01-14 NOTE — PLAN OF CARE
[facility] - Cardiology Stepdown  Initial Discharge Assessment       Primary Care Provider: MEÑO Abdalla    Admission Diagnosis: Non-ST elevation myocardial infarction (NSTEMI) [I21.4]  NSTEMI (non-ST elevation myocardial infarction) [I21.4]  NSTEMI (non-ST elevated myocardial infarction) [I21.4]  Chest pain [R07.9]    Admission Date: 1/13/2022  Expected Discharge Date: 1/16/2022    Discharge Barriers Identified: None    Payor: MEDICARE / Plan: MEDICARE PART A & B / Product Type: Government /     Extended Emergency Contact Information  Primary Emergency Contact: Annabel Lewis  Mobile Phone: [phone]  Relation: Friend    Discharge Plan A: Home  Discharge Plan B: Home Health,Home with family      [facility] - [address]  [address] [address]  Phone: [phone] Fax: [phone]      Initial Assessment (most recent)     Adult Discharge Assessment - 01/14/22 1103        Discharge Assessment    Confirmed/corrected address, phone number and insurance Yes     Confirmed Demographics Correct on Facesheet     Source of Information patient     Communicated JESSICA with patient/caregiver Yes     Lives With alone     Do you expect to return to your current living situation? Yes     Do you have help at home or someone to help you manage your care at home? No     Prior to hospitilization cognitive status: Alert/Oriented     Current cognitive status: Alert/Oriented     Home Layout Bathroom on 2nd floor;Bedroom on 2nd floor     Equipment Currently Used at Home cane, straight     Patient currently being followed by outpatient case management? No     Do you currently have service(s) that help you manage your care at home? No     Do you take prescription medications? Yes     Do you have prescription coverage? Yes     Do you have any problems affording any of your prescribed medications? No     Is the patient taking medications as prescribed? yes     How do you get to  doctors appointments? car, drives self     Are you on dialysis? No     Do you take coumadin? No     Discharge Plan A Home     Discharge Plan B Home Health;Home with family     DME Needed Upon Discharge  none     Discharge Plan discussed with: Patient     Discharge Barriers Identified None               Patient lives alone in a two story home with her bed/bath on the second floor requiring manipulation of 13 steps. She has had HH in the past, but cannot remember name of company. She does not feel she will need any post acute services upon hospital discharge. Patient friend Annabel may provide transportation home, otherwise patient is agreeable to Lyft/[employer] services.

## 2022-01-14 NOTE — HPI
"Ms Debbie Metcalf ("Sandra") is a 75 y/o F with T2DM, diabetic neuropathy, chronic back pain 2/2 scoliosis who presents with chest pain.     Pt reports being woken up from afternoon nap ~ 2:30 bpm with acute onset pain starting in her left arm and radiating to her left shoulder and left chest. She is unable to characterize nature of pain but reports it was associated with tingling. BGL at home 360 and she took 25U insulin. Her BGL came down but her pain remained and she called EMS to bring to the ER. Enroute, she was given SL NTG and aspirin, which helped alleviate the pain.     In the ER, /90, HR 88 bpm, sating 99% on RA. CBC wnl, CMP wnl. Trop 2.9, CXR wnl. ECG notable for sinus rhythm, 1st AVB, Q-waves septal and lateral leads. No prior ECGs available for comparison. On bedside Echo, LVED appears 55-60% with no WMA. RV wnl. Pt was not having active chest pain at the time of interview.    No prior history of chest pain. Does not exercise regularly but reports SOBOE for last 2 months with walking up flights of stairs or running after grandchildren. Started developing fluid in legs bilaterally 2 years ago for which she was started on aldactone. No prior TTE.     No FHX of premature CAD. No tobacco use, alcohol or other drug use. Worked as a  prior to snf.   "

## 2022-01-14 NOTE — ASSESSMENT & PLAN NOTE
Patient is a 76 year old female with history of T2DM (01/14/2022 Hgb a1c 9%), diabetic neuropathy, HTN, HLD (LDL 72 mg /dL on atorvastatin 20 mg PO at home), chronic back pain 2/2 scoliosis who presented to the ED on 01/13/2022 with left arm pain radiating to substernal chest discomfort. Admitted for NSTEMI to Hospital Medicine. In the ED, /90, HR 88 bpm, sating 99% on RA. CBC wnl, CMP wnl. CXR wnl. ECG notable for sinus rhythm, 1st AVB, Q-waves septal and lateral leads. Cr 1.1. Hgb 13.4 g/dL Plt 269 k cells/uL. No prior ECGs available for comparison. On bedside Echo by overnight fellow, LVEF appeared to be 55-60% with no WMA. RV wnl. Troponin trend 2.9 --> 16.25 --> 23.8. CT head without contrast obtained by primary over concerns regarding dysarthric speech, remote lacunar infarcts. Patient was loaded with  mg PO x 1 by EMS and Plavix 300 mg PO x 1 in ED. Interventional Cardiology consulted for recommendation on coronary angiography.       - Follow Formal TTE       - NPO since midnight       - Type and screen completed       - COVID-19 negative 01/13/2022       - Risk factor reduction (optimize BP, T2DM control)  --[facility] +/- PCI, patient is a NITISH candidate  - Anti-platelet Therapy: ASA / Plavix  - Access: Right radial  - Catheters: Gurdeep  - Creatinine/CrCl: 1.1  - Allergies: No shellfish / Iodine allergy  - Pre-Hydration: NS  - Pre-Op Med: Bendaryl 50mg PO x1  - All patient's questions were answered.  -The risks, benefits and alternatives of the procedure were explained to the patient.   -The risks of coronary angiography include but are not limited to: bleeding, infection, heart rhythm abnormalities, allergic reactions, kidney injury and potential need for dialysis, stroke and death.   - Should stenting be indicated, the patient has agreed to dual anti-platelet therapy for 1-consecutive year with a drug-eluting stent and a minimum of 1-month with the use of a bare metal stent  - Additionally, pt is  aware that non-compliance is likely to result in stent clotting with heart attack, heart failure, and/or death  -The risks of moderate sedation include hypotension, respiratory depression, arrhythmias, bronchospasm, and death.   - Informed consent was obtained and the patient is agreeable to proceed with the procedure.

## 2022-01-14 NOTE — OP NOTE
Post Cath Note  Referring Physician: Reno Souza*  Procedure: Angiogram, Coronary, with Left Heart Cath (N/A), IVUS, Coronary      : Tj Berry MD     Referring Physician: Self,Aaareferral     All Operators: Surgeon(s):  MD Ana Farah MD Partha Sardar, MD     Preoperative Diagnosis: NSTEMI (non-ST elevated myocardial infarction) [I21.4]     Postop Diagnosis: NSTEMI (non-ST elevated myocardial infarction) [I21.4]    Treatments/Procedures: Procedure(s) (LRB):  Angiogram, Coronary, with Left Heart Cath (N/A)  IVUS, Coronary    Estimated Blood loss: <50 cc         Access: Right radial    LVEDP 30 mmHg    See full report for further details    Intervention:   S/p PCI of mid LAD with NITISH x1(2.5 x 18 mm)  Closure device: Radial band    Post Cath Exam:   /81 (BP Location: Left arm, Patient Position: Lying)   Pulse 79   Temp 97.8 °F (36.6 °C) (Oral)   Resp 20   Wt 101.6 kg (223 lb 15.8 oz)   SpO2 97%   BMI 29.55 kg/m²   No unusual pain, hematoma, thrill or bruit at vascular access site.  Distal pulse present without signs of ischemia.    Recommendations:   - Routine post-cath care  - IVF at 100cc/hr for 3 hrs  - Cardiac rehab referral, Continue medical management, Risk factor reduction, Plavix for at least 1 year and ASA 81 mg indefinitely, Follow-up with outpatient cardiologist   -Follow up with Dr Berry in clinic in 3-4 weeks      Yevgeniy Lopez

## 2022-01-14 NOTE — SUBJECTIVE & OBJECTIVE
Interval History: Patient with chest pain this AM. Patient was taken to cath lab for angiogram.     Review of Systems   Constitutional: Negative for chills and fever.   Cardiovascular: Negative for chest pain, orthopnea and palpitations.   Respiratory: Negative for cough and shortness of breath.    Gastrointestinal: Negative for abdominal pain.   Neurological: Negative for dizziness, headaches and light-headedness.   Psychiatric/Behavioral: Negative for altered mental status.     Objective:     Vital Signs (Most Recent):  Temp: 97.4 °F (36.3 °C) (01/14/22 1135)  Pulse: 68 (01/14/22 1121)  Resp: 20 (01/14/22 0744)  BP: (!) 113/59 (01/14/22 1100)  SpO2: 96 % (01/14/22 1100) Vital Signs (24h Range):  Temp:  [97.4 °F (36.3 °C)-98 °F (36.7 °C)] 97.4 °F (36.3 °C)  Pulse:  [66-94] 68  Resp:  [16-22] 20  SpO2:  [95 %-100 %] 96 %  BP: (113-182)/(59-90) 113/59     Weight: 101.6 kg (223 lb 15.8 oz)  Body mass index is 29.55 kg/m².     SpO2: 96 %  O2 Device (Oxygen Therapy): room air      Intake/Output Summary (Last 24 hours) at 1/14/2022 1243  Last data filed at 1/14/2022 0600  Gross per 24 hour   Intake --   Output 400 ml   Net -400 ml       Lines/Drains/Airways     Peripheral Intravenous Line                 Peripheral IV - Single Lumen 01/13/22 1910 20 G Right Antecubital <1 day                Physical Exam  Vitals reviewed.   Constitutional:       General: She is not in acute distress.     Appearance: Normal appearance. She is not toxic-appearing or diaphoretic.   HENT:      Head: Normocephalic and atraumatic.      Mouth/Throat:      Mouth: Mucous membranes are moist.   Cardiovascular:      Rate and Rhythm: Normal rate and regular rhythm.      Heart sounds: No murmur heard.  No friction rub. No gallop.    Pulmonary:      Effort: Pulmonary effort is normal. No respiratory distress.      Breath sounds: Normal breath sounds.   Abdominal:      Palpations: Abdomen is soft.   Musculoskeletal:      Right lower leg: No edema.       Left lower leg: No edema.   Skin:     General: Skin is warm.      Coloration: Skin is not jaundiced.   Neurological:      Mental Status: She is alert and oriented to person, place, and time. Mental status is at baseline.   Psychiatric:         Mood and Affect: Mood normal.         Thought Content: Thought content normal.         Significant Labs:      Recent Labs   Lab 01/13/22 1912   *   *   K 4.1      CO2 23   BUN 11   CREATININE 1.1   CALCIUM 9.3      Recent Labs   Lab 01/13/22 1912   *   K 4.1      CO2 23   *   BUN 11   CREATININE 1.1   CALCIUM 9.3   PROT 7.2   ALBUMIN 3.6   BILITOT 0.6   ALKPHOS 99   AST 23   ALT 10   ANIONGAP 11   ESTGFRAFRICA 57.2*   EGFRNONAA 49.6*      Recent Labs   Lab 01/13/22 1912 01/13/22 1912 01/13/22 2150 01/13/22 2150 01/14/22  0420   WBC 11.53  --  11.95  --  12.08   HGB 14.0  --  13.1  --  13.4   HCT 42.5   < > 41.2   < > 42.2     --  252  --  269    < > = values in this interval not displayed.     Recent Labs   Lab 01/13/22 2150   INR 0.9      Recent Labs   Lab 01/14/22  0419   CHOL 152   HDL 57   LDLCALC 72.8   TRIG 111   CHOLHDL 37.5      Recent Labs   Lab 01/13/22 1912 01/13/22 2050 01/14/22  0420   TROPONINI 2.900* 16.249* 23.859*  23.691*   All pertinent lab results from the last 24 hours have been reviewed.    Significant Imaging: all significant imaging reviewed in past 24 hours.

## 2022-01-14 NOTE — ED PROVIDER NOTES
Encounter Date: 1/13/2022       History     Chief Complaint   Patient presents with    Chest Pain     X 30 min. 1 nitro and 1 asa given.      Ms Debbie Metcalf is a very pleasant 76 y.o. woman with a PMHx of T2DM, diabetic neuropathy, h/o back pain, presenting to the ED after waking up from her afternoon nap at 14:30 to chest pain radiating to her left arm that worsened when lying down, and associated with SOB and tingling and pain in both hands. She also noted  upon waking up at which point she reports taking 25 U aspart. On evaluation, patient indicates chest pain in the left and mid chest has dissipated, no longer short of breath, and denies having had dizziness, headache or abdominal pain. Her only current symptoms are lingering pain / tingling in her hands. 1 nitro and 1 asa given by EMS.        Review of patient's allergies indicates:  No Known Allergies  Past Medical History:   Diagnosis Date    Diabetes mellitus      Past Surgical History:   Procedure Laterality Date    EYE SURGERY      gender reaffirmation       Family History   Problem Relation Age of Onset    Lung cancer Mother     Stroke Father     Diabetes Father      Social History     Tobacco Use    Smoking status: Never Smoker    Smokeless tobacco: Never Used   Substance Use Topics    Alcohol use: Never    Drug use: Never     Review of Systems   Constitutional: Negative for chills and fever.   HENT: Negative for sore throat.    Eyes: Negative for pain and visual disturbance.   Respiratory: Negative for shortness of breath.    Cardiovascular: Positive for chest pain (radiating to left arm, back). Negative for palpitations.   Gastrointestinal: Negative for abdominal pain.   Genitourinary: Negative for difficulty urinating and dysuria.   Musculoskeletal: Negative for back pain.   Skin: Negative for rash.   Neurological: Positive for light-headedness (previously). Negative for headaches.   Psychiatric/Behavioral: Negative for  confusion.       Physical Exam     Initial Vitals [01/13/22 1712]   BP Pulse Resp Temp SpO2   (!) 160/90 88 19 97.5 °F (36.4 °C) 99 %      MAP       --         Physical Exam    Nursing note and vitals reviewed.  Constitutional: She appears well-developed.   HENT:   Head: Normocephalic.   Eyes: EOM are normal. Pupils are equal, round, and reactive to light. No scleral icterus.   Cardiovascular: Normal rate and regular rhythm.   Pulmonary/Chest: Breath sounds normal. She has no rhonchi. She has no rales.   Abdominal: Abdomen is soft. Bowel sounds are normal.     Neurological: She is alert and oriented to person, place, and time. She has normal strength. GCS score is 15. GCS eye subscore is 4. GCS verbal subscore is 5. GCS motor subscore is 6.   Skin: Skin is warm and dry.   Psychiatric: She has a normal mood and affect.         ED Course   Procedures  Labs Reviewed   CBC W/ AUTO DIFFERENTIAL - Abnormal; Notable for the following components:       Result Value    MPV 9.1 (*)     Gran # (ANC) 9.0 (*)     Immature Grans (Abs) 0.06 (*)     Gran % 78.1 (*)     Lymph % 14.0 (*)     All other components within normal limits   COMPREHENSIVE METABOLIC PANEL - Abnormal; Notable for the following components:    Sodium 135 (*)     Glucose 195 (*)     eGFR if  57.2 (*)     eGFR if non  49.6 (*)     All other components within normal limits   TROPONIN I - Abnormal; Notable for the following components:    Troponin I 2.900 (*)     All other components within normal limits   TROPONIN I - Abnormal; Notable for the following components:    Troponin I 16.249 (*)     All other components within normal limits   CBC W/ AUTO DIFFERENTIAL - Abnormal; Notable for the following components:    MCHC 31.8 (*)     MPV 9.1 (*)     Gran # (ANC) 9.3 (*)     Immature Grans (Abs) 0.05 (*)     Gran % 77.9 (*)     Lymph % 16.2 (*)     All other components within normal limits    Narrative:     (if patient is on warfarin  prior to heparin therapy)   B-TYPE NATRIURETIC PEPTIDE   APTT    Narrative:     (if patient is on warfarin prior to heparin therapy)   PROTIME-INR    Narrative:     (if patient is on warfarin prior to heparin therapy)   SARS-COV-2 RDRP GENE    Narrative:     This test utilizes isothermal nucleic acid amplification   technology to detect the SARS-CoV-2 RdRp nucleic acid segment.   The analytical sensitivity (limit of detection) is 125 genome   equivalents/mL.   A POSITIVE result implies infection with the SARS-CoV-2 virus;   the patient is presumed to be contagious.     A NEGATIVE result means that SARS-CoV-2 nucleic acids are not   present above the limit of detection. A NEGATIVE result should be   treated as presumptive. It does not rule out the possibility of   COVID-19 and should not be the sole basis for treatment decisions.   If COVID-19 is strongly suspected based on clinical and exposure   history, re-testing using an alternate molecular assay should be   considered.   This test is only for use under the Food and Drug   Administration s Emergency Use Authorization (EUA).   Commercial kits are provided by [facility].   Performance characteristics of the EUA have been independently   verified by [facility] Department of   Pathology and Laboratory Medicine.   _________________________________________________________________   The authorized Fact Sheet for Healthcare Providers and the authorized Fact   Sheet for Patients of the ID NOW COVID-19 are available on the FDA   website:     https://www.fda.gov/media/318061/download  https://www.fda.gov/media/729099/download       TYPE & SCREEN     EKG Readings: (Independently Interpreted)   Initial Reading: No STEMI. Rhythm: Normal Sinus Rhythm. Heart Rate: 93. Ectopy: No Ectopy.   Other EKG Interpretations: Repeat : sinus 85, normal axis      ECG Results          Repeat EKG 12-lead (Final result)  Result time 01/14/22 11:40:17    Final result by  Interface, Lab In [facility] (01/14/22 11:40:17)                 Narrative:    Test Reason : R07.9,    Vent. Rate : 087 BPM     Atrial Rate : 087 BPM     P-R Int : 232 ms          QRS Dur : 114 ms      QT Int : 366 ms       P-R-T Axes : 066 -70 104 degrees     QTc Int : 440 ms    Sinus rhythm with 1st degree A-V block  Left anterior fascicular block  Septal infarct ,age undetermined  Abnormal ECG  When compared with ECG of 13-JAN-2022 20:47,  No significant change was found  Confirmed by Fazal Musa MD ([id number]) on 1/14/2022 11:40:05 AM    Referred By: EDMAR   SELF           Confirmed By:Fazal Musa MD                             EKG 12-lead (Final result)  Result time 01/14/22 11:09:41    Final result by Interface, Lab In [facility] (01/14/22 11:09:41)                 Narrative:    Test Reason : I21.4,    Vent. Rate : 085 BPM     Atrial Rate : 085 BPM     P-R Int : 238 ms          QRS Dur : 112 ms      QT Int : 342 ms       P-R-T Axes : 085 -66 089 degrees     QTc Int : 406 ms    Baseline wander  Sinus rhythm with 1st degree A-V block  Left anterior fascicular block  Septal infarct ,age undetermined  Abnormal ECG  No previous ECGs available  Confirmed by Fazal Musa MD ([id number]) on 1/14/2022 11:09:33 AM    Referred By: EDMAR   SELF           Confirmed By:Fazal Musa MD                             EKG 12-lead (Final result)  Result time 01/14/22 10:06:01    Final result by Interface, Lab In [facility] (01/14/22 10:06:01)                 Narrative:    Test Reason : R07.9,    Vent. Rate : 093 BPM     Atrial Rate : 093 BPM     P-R Int : 176 ms          QRS Dur : 120 ms      QT Int : 348 ms       P-R-T Axes : 019 -66 062 degrees     QTc Int : 432 ms    Normal sinus rhythm  Left anterior fascicular block  Septal infarct ,age undetermined  Abnormal R wave progression  Abnormal ECG  No previous ECGs available  Confirmed by Fazal Musa MD ([id number]) on 1/14/2022 10:05:49 AM    Referred By: EDMAR   SELF           Confirmed By:Fazal  Lencho BURCH                            Imaging Results          CT Head Without Contrast (Final result)  Result time 01/14/22 02:00:32    Final result by Jose Hanks MD (01/14/22 02:00:32)                 Impression:      No acute intracranial hemorrhage or large territory recent infarction.    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    Patchy areas of hypoattenuation in the periventricular white matter, likely sequela of chronic microvascular ischemic change.  Senescent changes.    Vertebrobasilar dolichoectasia.    Electronically signed by resident: Margarita Robert  Date:    01/14/2022  Time:    01:39    Electronically signed by: Jose Hanks MD  Date:    01/14/2022  Time:    02:00             Narrative:    EXAMINATION:  CT HEAD WITHOUT CONTRAST    CLINICAL HISTORY:  Neuro deficit, acute, stroke suspected;    TECHNIQUE:  Low dose axial CT images obtained throughout the head without the use of intravenous contrast.  Axial, sagittal, and coronal reconstructions were performed.    COMPARISON:  None.    FINDINGS:  Intracranial compartment:    Diffuse cerebral volume loss with compensatory sulcal and ventricular enlargement.  No evidence of hydrocephalus.    Small area of hypoattenuation in the left external capsule (axial series 2, image 14 and right basal ganglia (axial series 2, image 15) as well as the medial aspect of the left parietal lobe (axial series 2, image 19).  These findings likely represent remote lacunar type infarcts.  Patchy areas of hypoattenuation in the periventricular white matter, likely sequela of chronic microvascular ischemic change.  No new parenchymal mass, hemorrhage, edema, or major vascular distribution infarct.    No extra-axial blood or fluid collections.  Vertebrobasilar dolichoectasia.    Skull/extracranial contents (limited evaluation):    No fracture.    Mastoid air cells and paranasal sinuses are essentially  clear.                               X-Ray Chest AP Portable (Final result)  Result time 01/13/22 21:16:15    Final result by Jose aHnks MD (01/13/22 21:16:15)                 Impression:      No acute findings in the chest.    Underinflated lungs with hypoventilatory change.  Lordotic positioning, limiting assessment of the lung bases.      Electronically signed by: Jose Hanks MD  Date:    01/13/2022  Time:    21:16             Narrative:    EXAMINATION:  XR CHEST AP PORTABLE    CLINICAL HISTORY:  Chest pain, unspecified    TECHNIQUE:  Single frontal view of the chest was performed.    COMPARISON:  None    FINDINGS:  Lordotic positioning.  Underinflated lungs with hypoventilatory change.    No consolidation, pleural effusion or pneumothorax.    Cardiomediastinal silhouette is unremarkable.                                 Medications   heparin 25,000 units in dextrose 5% 250 mL (100 units/mL) infusion LOW INTENSITY nomogram - [facility] (15 Units/kg/hr × 86.4 kg (Adjusted) Intravenous Handoff 1/14/22 0709)   heparin 25,000 units in dextrose 5% (100 units/ml) IV bolus from bag - ADDITIONAL PRN BOLUS - 60 units/kg (max bolus 4000 units) (4,000 Units Intravenous Bolus from Bag 1/14/22 0640)   heparin 25,000 units in dextrose 5% (100 units/ml) IV bolus from bag - ADDITIONAL PRN BOLUS - 30 units/kg (max bolus 4000 units) (has no administration in time range)   sodium chloride 0.9% flush 10 mL (has no administration in time range)   melatonin tablet 6 mg (has no administration in time range)   acetaminophen tablet 650 mg (has no administration in time range)   polyethylene glycol packet 17 g (has no administration in time range)   bisacodyL suppository 10 mg (has no administration in time range)   ondansetron disintegrating tablet 8 mg (has no administration in time range)   promethazine tablet 25 mg (has no administration in time range)   glucose chewable tablet 16 g (has no administration in time range)   glucose  chewable tablet 24 g (has no administration in time range)   dextrose 50% injection 12.5 g (has no administration in time range)   dextrose 50% injection 25 g (has no administration in time range)   glucagon (human recombinant) injection 1 mg (has no administration in time range)   albuterol-ipratropium 2.5 mg-0.5 mg/3 mL nebulizer solution 3 mL (has no administration in time range)   insulin aspart U-100 pen 0-5 Units (2 Units Subcutaneous Given 1/14/22 1300)   aspirin chewable tablet 81 mg (81 mg Oral Given 1/14/22 0840)   metoprolol tartrate (LOPRESSOR) tablet 25 mg (25 mg Oral Given 1/14/22 0840)   nitroGLYCERIN SL tablet 0.4 mg (has no administration in time range)   clopidogreL tablet 75 mg (75 mg Oral Given 1/14/22 0840)   insulin detemir U-100 pen 25 Units (25 Units Subcutaneous Given 1/14/22 0305)   insulin aspart U-100 pen 8 Units (8 Units Subcutaneous Given 1/14/22 1300)   losartan split tablet 12.5 mg (has no administration in time range)   atorvastatin tablet 80 mg (80 mg Oral Given 1/14/22 0258)   0.9%  NaCl infusion ( Intravenous New Bag 1/14/22 0715)   LIDOcaine HCL 20 mg/ml (2%) injection (20 mLs Other Given 1/14/22 0856)   heparin infusion 1,000 units/500 ml in 0.9% NaCl (on sterile field) (1,500 mLs Intra-Catheter Given 1/14/22 0857)   heparin (porcine) 750 Units, verapamiL 1.25 mg, nitroGLYCERIN 1.25 mg in sodium chloride 0.9% 250 mL ( Intra-arterial Given 1/14/22 0857)   nitroglycerin 200mcg/mL syringe (800 mcg Intra-arterial Given 1/14/22 0857)   fentaNYL 50 mcg/mL injection (25 mcg Intravenous Given 1/14/22 0937)   midazolam injection (0.5 mg Intravenous Given 1/14/22 0937)   heparin (porcine) injection (2,000 Units Intravenous Given 1/14/22 0937)   nitroGLYCERIN SL tablet 0.4 mg (0.4 mg Sublingual Given 1/13/22 2030)   nitroGLYCERIN 2% TD oint ointment 1 inch (1 inch Topical (Top) Given 1/13/22 2100)   heparin 25,000 units in dextrose 5% (100 units/ml) IV bolus from bag INITIAL BOLUS (max  bolus 4000 units) (4,000 Units Intravenous Bolus from Bag 1/13/22 8173)   clopidogreL tablet 300 mg (300 mg Oral Given 1/13/22 2159)   diphenhydrAMINE capsule 50 mg (50 mg Oral Given 1/14/22 0840)   perflutren protein-A microsphr 0.22 mg/mL IV susp (0.11 mg Intravenous Given 1/14/22 0900)     Medical Decision Making:   Initial Assessment:   Debbie Metcalf is a 76 y.o. woman with typical chest pain and risk factors for heart disease.     Initial concern is for MI vs unstable angina  Differential Diagnosis:   Differentials include but not limited to MI, unstable angina, PE, aortic dissection.  ED Management:  STAT ECG was negative for STEMI or other acute abnormalities    Workup:  -- Troponin: 2.9; Repeat: 16  -- BNP: WNL  -- CMP: WNL; low-normal Na+, mild hyperglycemia 195  -- CBC: WNL    ED interventions  -- Patient was previously given aspirin by EMS, and thus was not initially re-administered it here in the ED  -- Pt was given multiple NTG doses (1 by EMS, 2x ED), followed by transdermal NTG, with mild improvement from peak 6/10 to 3/10 pain.  -- plavix 300  -- heparin infusion  -- admission to inpatient hospital medicine            Attending Attestation:   Physician Attestation Statement for Resident:  As the supervising MD   Physician Attestation Statement: I have personally seen and examined this patient.   I agree with the above history. -:   As the supervising MD I agree with the above PE.    As the supervising MD I agree with the above treatment, course, plan, and disposition.        Attending Critical Care:   Critical Care Times:   Direct Patient Care (initial evaluation, reassessments, and time considering the case)................................................................12 minutes.   Additional History from reviewing old medical records or taking additional history from the family, EMS, PCP, etc.......................5 minutes.   Ordering, Reviewing, and Interpreting Diagnostic  Studies...............................................................................................................4 minutes.   Documentation..................................................................................................................................................................................5 minutes.   Consultation with other Physicians. .................................................................................................................................................6 minutes.   Consultation with the patient's family directly relating to the patient's condition, care, and DNR status (when patient unable)......4 minutes.   ==============================================================  · Total Critical Care Time - exclusive of procedural time: 36 minutes.  ==============================================================      Attending ED Notes:   CP relieved with nitro. ASA given by EMS. EKG without acute ischemic change, no priors for comparison. First trop elevated. eval'd by cards. ACS protocol initiated. 2nd trop continues to trend up. Will admit       ED Course as of 01/14/22 1525   Thu Jan 13, 2022 1930 Troponin I(!): 2.900 [MY]   2130 Troponin I(!): 16.249 [MY]      ED Course User Index  [MY] Ulysses Voss MD             Clinical Impression:   Final diagnoses:  [R07.9] Chest pain          ED Disposition Condition    Admit             Ulysses Voss MD  Neurology resident, PGY-1  Department of Neurology  [facility]       Ulysses Voss MD  Resident  01/13/22 1200       Nikkie Guevara MD  01/14/22 1525

## 2022-01-14 NOTE — PROGRESS NOTES
[facility] Step[facility]  Cardiology  Progress Note    Patient Name: Debbie Metcalf  MRN: [id number]  Admission Date: 1/13/2022  Hospital Length of Stay: 1 days  Code Status: Full Code   Attending Physician: Reno Souza*   Primary Care Physician: MEÑO Abdalla  Expected Discharge Date: 1/16/2022  Principal Problem:NSTEMI (non-ST elevated myocardial infarction)    Subjective:     Hospital Course:   Patient with elevated troponin of 16 and then 23 on repeat level. Patient started on heparin gtt and ACS protocol. Patient S/p PCI of mid LAD with NITISH. Patient without chest pain after procedure.          Interval History: Patient with chest pain this AM. Patient was taken to cath lab for angiogram.     Review of Systems   Constitutional: Negative for chills and fever.   Cardiovascular: Negative for chest pain, orthopnea and palpitations.   Respiratory: Negative for cough and shortness of breath.    Gastrointestinal: Negative for abdominal pain.   Neurological: Negative for dizziness, headaches and light-headedness.   Psychiatric/Behavioral: Negative for altered mental status.     Objective:     Vital Signs (Most Recent):  Temp: 97.4 °F (36.3 °C) (01/14/22 1135)  Pulse: 68 (01/14/22 1121)  Resp: 20 (01/14/22 0744)  BP: (!) 113/59 (01/14/22 1100)  SpO2: 96 % (01/14/22 1100) Vital Signs (24h Range):  Temp:  [97.4 °F (36.3 °C)-98 °F (36.7 °C)] 97.4 °F (36.3 °C)  Pulse:  [66-94] 68  Resp:  [16-22] 20  SpO2:  [95 %-100 %] 96 %  BP: (113-182)/(59-90) 113/59     Weight: 101.6 kg (223 lb 15.8 oz)  Body mass index is 29.55 kg/m².     SpO2: 96 %  O2 Device (Oxygen Therapy): room air      Intake/Output Summary (Last 24 hours) at 1/14/2022 1243  Last data filed at 1/14/2022 0600  Gross per 24 hour   Intake --   Output 400 ml   Net -400 ml       Lines/Drains/Airways     Peripheral Intravenous Line                 Peripheral IV - Single Lumen 01/13/22 1910 20 G Right Antecubital <1 day                Physical  Exam  Vitals reviewed.   Constitutional:       General: She is not in acute distress.     Appearance: Normal appearance. She is not toxic-appearing or diaphoretic.   HENT:      Head: Normocephalic and atraumatic.      Mouth/Throat:      Mouth: Mucous membranes are moist.   Cardiovascular:      Rate and Rhythm: Normal rate and regular rhythm.      Heart sounds: No murmur heard.  No friction rub. No gallop.    Pulmonary:      Effort: Pulmonary effort is normal. No respiratory distress.      Breath sounds: Normal breath sounds.   Abdominal:      Palpations: Abdomen is soft.   Musculoskeletal:      Right lower leg: No edema.      Left lower leg: No edema.   Skin:     General: Skin is warm.      Coloration: Skin is not jaundiced.   Neurological:      Mental Status: She is alert and oriented to person, place, and time. Mental status is at baseline.   Psychiatric:         Mood and Affect: Mood normal.         Thought Content: Thought content normal.         Significant Labs:      Recent Labs   Lab 01/13/22 1912   *   *   K 4.1      CO2 23   BUN 11   CREATININE 1.1   CALCIUM 9.3      Recent Labs   Lab 01/13/22 1912   *   K 4.1      CO2 23   *   BUN 11   CREATININE 1.1   CALCIUM 9.3   PROT 7.2   ALBUMIN 3.6   BILITOT 0.6   ALKPHOS 99   AST 23   ALT 10   ANIONGAP 11   ESTGFRAFRICA 57.2*   EGFRNONAA 49.6*      Recent Labs   Lab 01/13/22 1912 01/13/22 1912 01/13/22 2150 01/13/22 2150 01/14/22 0420   WBC 11.53  --  11.95  --  12.08   HGB 14.0  --  13.1  --  13.4   HCT 42.5   < > 41.2   < > 42.2     --  252  --  269    < > = values in this interval not displayed.     Recent Labs   Lab 01/13/22 2150   INR 0.9      Recent Labs   Lab 01/14/22  0419   CHOL 152   HDL 57   LDLCALC 72.8   TRIG 111   CHOLHDL 37.5      Recent Labs   Lab 01/13/22 1912 01/13/22 2050 01/14/22  0420   TROPONINI 2.900* 16.249* 23.859*  23.691*   All pertinent lab results from the last 24 hours have been  reviewed.    Significant Imaging: all significant imaging reviewed in past 24 hours.    Assessment and Plan:     Brief HPI:    * NSTEMI (non-ST elevated myocardial infarction)  - Patient presents with NSTEMI, NILDA 116, CHEIKH 3 pts, currently hemodynamically stable and chest pain free, admited to Hospital Medicine, and started on heparin/DAPT/high intensity statin. Patient initially with pain in left arm, shoulder that radiated to central chest. Patient improved with SL nitro. Patient taken by interventional radiology with NITISH placement to LAD. Patient tolerated procedure well and is chest pain free afterwards.    Plan/recs:  -Continue DAPT and continue for at least 1 year  - Continue heparin gtt 48 hours total duration  - Continue Metoprolol Tartrate 25 mg BID  - Continue atorva 80mg daily  - Continue low dose ARB (losartan 12.5) and uptitrate anti-hypertensives as tolerated for target SBP <130  - follow up formal ECHO to assess LV/RV function  - serial ECG as needed for chest pain  -Recommend Cardiology follow up outpatient and cardiac rehab.  -Recommend patient follow up with Endocrinology for better diabetes control.  -Cardiology will sign off at this time, please reach out for additional questions/concerns.        VTE Risk Mitigation (From admission, onward)         Ordered     heparin (porcine) injection  As needed (PRN)         01/14/22 0914     heparin (porcine) 750 Units, verapamiL 1.25 mg, nitroGLYCERIN 1.25 mg in sodium chloride 0.9% 250 mL  As needed (PRN)         01/14/22 0857     heparin infusion 1,000 units/500 ml in 0.9% NaCl (on sterile field)  As needed (PRN)         01/14/22 0857     heparin 25,000 units in dextrose 5% (100 units/ml) IV bolus from bag - ADDITIONAL PRN BOLUS - 60 units/kg (max bolus 4000 units)  As needed (PRN)        Question:  Heparin Infusion Adjustment (DO NOT MODIFY ANSWER)  Answer:  [email or web address]\epic\Images\Pharmacy\HeparinInfusions\heparin LOW INTENSITY nomogram for [facility]  [id number]    01/13/22 2139     heparin 25,000 units in dextrose 5% (100 units/ml) IV bolus from bag - ADDITIONAL PRN BOLUS - 30 units/kg (max bolus 4000 units)  As needed (PRN)        Question:  Heparin Infusion Adjustment (DO NOT MODIFY ANSWER)  Answer:  [email or web address]\epic\Images\Pharmacy\HeparinInfusions\heparin LOW INTENSITY nomogram for [facility] [id number]    01/13/22 2139     Reason for No Pharmacological VTE Prophylaxis  Once        Question:  Reasons:  Answer:  IV Heparin w/in 24 hrs. Pre or Post-Op    01/13/22 2311     IP VTE HIGH RISK PATIENT  Once         01/13/22 2311     Place sequential compression device  Until discontinued         01/13/22 2259     heparin 25,000 units in dextrose 5% 250 mL (100 units/mL) infusion LOW INTENSITY nomogram - [facility]  Continuous        Question Answer Comment   Heparin Infusion Adjustment (DO NOT MODIFY ANSWER) [email or web address]\epic\Images\Pharmacy\HeparinInfusions\heparin LOW INTENSITY nomogram for [facility] [id number]    Begin at (in units/kg/hr) 12        01/13/22 2139                Patrice Rodriguez MD  Cardiology  [facility]

## 2022-01-14 NOTE — ASSESSMENT & PLAN NOTE
- BG goal 140 - 180   - home regimen: levemir 90U qhs + 20U novolog TIDWM  - inpatient regimen: detemir 25U BID + aspart 8U TIDWM + LDSSI  - ACHS accuchecks  - repeat A1c

## 2022-01-14 NOTE — SUBJECTIVE & OBJECTIVE
Interval History: To cath lab today. Denies chest pain.     Review of Systems   Constitutional: Negative for appetite change, chills and fever.   Respiratory: Negative for cough, chest tightness and shortness of breath.    Cardiovascular: Negative for chest pain and leg swelling.   Gastrointestinal: Negative for abdominal distention, abdominal pain, nausea and vomiting.   Musculoskeletal: Negative for arthralgias and myalgias.   Skin: Negative for color change and pallor.   Neurological: Negative for dizziness and light-headedness.   Psychiatric/Behavioral: Negative for agitation and confusion.     Objective:     Vital Signs (Most Recent):  Temp: 97.4 °F (36.3 °C) (01/14/22 1135)  Pulse: 71 (01/14/22 1200)  Resp: 20 (01/14/22 0744)  BP: 132/68 (01/14/22 1200)  SpO2: 98 % (01/14/22 1200) Vital Signs (24h Range):  Temp:  [97.4 °F (36.3 °C)-98 °F (36.7 °C)] 97.4 °F (36.3 °C)  Pulse:  [66-94] 71  Resp:  [16-22] 20  SpO2:  [95 %-100 %] 98 %  BP: ()/(59-90) 132/68     Weight: 101.6 kg (223 lb 15.8 oz)  Body mass index is 29.55 kg/m².    Intake/Output Summary (Last 24 hours) at 1/14/2022 1601  Last data filed at 1/14/2022 0600  Gross per 24 hour   Intake --   Output 400 ml   Net -400 ml      Physical Exam  Constitutional:       General: She is not in acute distress.     Appearance: Normal appearance. She is not toxic-appearing or diaphoretic.   Cardiovascular:      Rate and Rhythm: Normal rate and regular rhythm.      Heart sounds: No murmur heard.  No friction rub. No gallop.    Pulmonary:      Effort: Pulmonary effort is normal. No respiratory distress.      Breath sounds: Normal breath sounds. No wheezing or rales.   Abdominal:      General: Abdomen is flat. There is no distension.      Palpations: Abdomen is soft.      Tenderness: There is no abdominal tenderness. There is no guarding or rebound.   Musculoskeletal:      Right lower leg: No edema.      Left lower leg: No edema.   Neurological:      Mental Status:  She is alert.         MELD-Na score: 6 at 1/14/2022 11:56 AM  MELD score: 6 at 1/14/2022 11:56 AM  Calculated from:  Serum Creatinine: 1.0 mg/dL at 1/14/2022 11:56 AM  Serum Sodium: 137 mmol/L at 1/14/2022 11:56 AM  Total Bilirubin: 0.6 mg/dL (Using min of 1 mg/dL) at 1/13/2022  7:12 PM  INR(ratio): 0.9 (Using min of 1) at 1/13/2022  9:50 PM  Age: 76 years    Significant Labs:  CBC:  Recent Labs   Lab 01/13/22 1912 01/13/22 2150 01/14/22  0420   WBC 11.53 11.95 12.08   HGB 14.0 13.1 13.4   HCT 42.5 41.2 42.2    252 269     CMP:  Recent Labs   Lab 01/13/22 1912 01/14/22  1156   * 137   K 4.1 4.0    104   CO2 23 24   * 226*   BUN 11 11   CREATININE 1.1 1.0   CALCIUM 9.3 9.2   PROT 7.2  --    ALBUMIN 3.6  --    BILITOT 0.6  --    ALKPHOS 99  --    AST 23  --    ALT 10  --    ANIONGAP 11 9   EGFRNONAA 49.6* 55.6*     PTINR:  Recent Labs   Lab 01/13/22 2150   INR 0.9       Significant Procedures:   Dobutamine Stress Test with Color Flow: No results found for this or any previous visit.

## 2022-01-14 NOTE — HOSPITAL COURSE
Patient with elevated troponin of 16 and then 23 on repeat level. Patient started on heparin gtt and ACS protocol. Patient S/p PCI of mid LAD with NITISH. Patient without chest pain after procedure.

## 2022-01-14 NOTE — SUBJECTIVE & OBJECTIVE
Past Medical History:   Diagnosis Date    Diabetes mellitus        Past Surgical History:   Procedure Laterality Date    EYE SURGERY      gender reaffirmation         Review of patient's allergies indicates:  No Known Allergies    No current facility-administered medications on file prior to encounter.     Current Outpatient Medications on File Prior to Encounter   Medication Sig    atorvastatin (LIPITOR) 20 MG tablet Take 20 mg by mouth once daily.    estradioL (ESTRACE) 2 MG tablet Take 2 mg by mouth 2 (two) times a day.    insulin aspart U-100 (NOVOLOG) 100 unit/mL injection Inject into the skin 3 (three) times daily before meals.    insulin detemir U-100 (LEVEMIR) 100 unit/mL injection Inject 100 Units into the skin every evening.    LANCETS MISC by Misc.(Non-Drug; Combo Route) route.    minoxidil (ROGAINE TOP) Apply topically.    sodium chloride 5% (SAKINA 128) 5 % ophthalmic solution Place 1 drop into both eyes 3 (three) times daily.    spironolactone (ALDACTONE) 25 MG tablet Take 25 mg by mouth once daily.     Family History    None       Tobacco Use    Smoking status: Never Smoker    Smokeless tobacco: Never Used   Substance and Sexual Activity    Alcohol use: Never    Drug use: Never    Sexual activity: Not on file     Review of Systems   Constitutional: Negative for activity change, appetite change, chills and fever.   HENT: Negative for congestion and voice change.    Eyes: Negative for photophobia and visual disturbance.   Respiratory: Negative for cough, chest tightness, shortness of breath and wheezing.    Cardiovascular: Positive for chest pain. Negative for palpitations and leg swelling.   Gastrointestinal: Negative for abdominal distention, abdominal pain, blood in stool, diarrhea, nausea and vomiting.   Endocrine: Negative for polyphagia and polyuria.   Genitourinary: Negative for decreased urine volume, dysuria, flank pain, frequency and hematuria.   Musculoskeletal: Positive for  back pain (chronic). Negative for arthralgias.   Skin: Negative for color change and wound.   Neurological: Positive for speech difficulty and numbness. Negative for dizziness, syncope, facial asymmetry, weakness, light-headedness and headaches.   Psychiatric/Behavioral: Negative for confusion, decreased concentration and dysphoric mood.     Objective:     Vital Signs (Most Recent):  Temp: 97.5 °F (36.4 °C) (01/13/22 1712)  Pulse: 86 (01/13/22 2232)  Resp: (!) 22 (01/13/22 2101)  BP: 134/77 (01/13/22 2232)  SpO2: 95 % (01/13/22 2232) Vital Signs (24h Range):  Temp:  [97.5 °F (36.4 °C)] 97.5 °F (36.4 °C)  Pulse:  [86-94] 86  Resp:  [19-22] 22  SpO2:  [95 %-100 %] 95 %  BP: (134-182)/(77-90) 134/77     Weight: 103 kg (227 lb)  Body mass index is 29.95 kg/m².    Physical Exam  Vitals and nursing note reviewed.   Constitutional:       General: She is not in acute distress.     Appearance: She is well-developed. She is obese.   HENT:      Head: Normocephalic and atraumatic.      Mouth/Throat:      Pharynx: No oropharyngeal exudate.   Eyes:      General: No scleral icterus.     Extraocular Movements: Extraocular movements intact.      Conjunctiva/sclera: Conjunctivae normal.   Cardiovascular:      Rate and Rhythm: Normal rate and regular rhythm.      Heart sounds: Normal heart sounds.   Pulmonary:      Effort: Pulmonary effort is normal. No respiratory distress.      Breath sounds: Normal breath sounds. No wheezing.   Abdominal:      General: Bowel sounds are normal. There is no distension.      Palpations: Abdomen is soft.      Tenderness: There is no abdominal tenderness.   Musculoskeletal:         General: No tenderness. Normal range of motion.      Cervical back: Normal range of motion and neck supple.      Right lower leg: No edema.      Left lower leg: No edema.   Lymphadenopathy:      Cervical: No cervical adenopathy.   Skin:     General: Skin is warm and dry.      Capillary Refill: Capillary refill takes less than  2 seconds.      Findings: No rash.   Neurological:      Mental Status: She is alert and oriented to person, place, and time.      Cranial Nerves: No cranial nerve deficit.      Sensory: No sensory deficit.      Coordination: Coordination normal.   Psychiatric:         Behavior: Behavior normal.         Thought Content: Thought content normal.         Judgment: Judgment normal.             Significant Labs:   All pertinent labs within the past 24 hours have been reviewed.  CBC:   Recent Labs   Lab 01/13/22 1912 01/13/22  2150   WBC 11.53 11.95   HGB 14.0 13.1   HCT 42.5 41.2    252     CMP:   Recent Labs   Lab 01/13/22 1912   *   K 4.1      CO2 23   *   BUN 11   CREATININE 1.1   CALCIUM 9.3   PROT 7.2   ALBUMIN 3.6   BILITOT 0.6   ALKPHOS 99   AST 23   ALT 10   ANIONGAP 11   EGFRNONAA 49.6*     Troponin:   Recent Labs   Lab 01/13/22 1912 01/13/22 2050   TROPONINI 2.900* 16.249*       Significant Imaging: I have reviewed all pertinent imaging results/findings within the past 24 hours.   X-Ray Chest AP Portable  Narrative: EXAMINATION:  XR CHEST AP PORTABLE    CLINICAL HISTORY:  Chest pain, unspecified    TECHNIQUE:  Single frontal view of the chest was performed.    COMPARISON:  None    FINDINGS:  Lordotic positioning.  Underinflated lungs with hypoventilatory change.    No consolidation, pleural effusion or pneumothorax.    Cardiomediastinal silhouette is unremarkable.  Impression: No acute findings in the chest.    Underinflated lungs with hypoventilatory change.  Lordotic positioning, limiting assessment of the lung bases.    Electronically signed by: Jose Hanks MD  Date:    01/13/2022  Time:    21:16

## 2022-01-14 NOTE — ED NOTES
LOC: The patient is awake, alert and aware of environment with an appropriate affect, the patient is oriented x 3 and speaking appropriately.   APPEARANCE: Patient appears comfortable and in no acute distress, patient is clean and well groomed.  SKIN: The skin is warm and dry, color consistent with ethnicity.   MUSCULOSKELETAL: Patient moving all extremities spontaneously, no swelling noted.  RESPIRATORY: Airway is open and patent, respirations are spontaneous, patient has a normal effort and rate, no accessory muscle use noted.  CARDIAC: Patient has a normal rate and regular rhythm, no edema noted, capillary refill < 3 seconds.   GASTRO: Soft and non tender to palpation, no distention noted.   : Pt denies any pain or frequency with urination.  NEURO: Pt opens eyes spontaneously, behavior appropriate to situation, follows commands.

## 2022-01-14 NOTE — HPI
"Debbie Metcalf is a 76 y.o. female with a PMHx of HTN, HLD, T2DM, diabetic neuropathy who is being admitted to hospital medicine for NSTEMI. Patient presented to [facility] [place] with complaints of acute onset left sided "dull, achy" chest pain with associated radiation to her left shoulder/arm that woke her up from a nap around 2:30pm today. The pain worsened with exertion and minimally improved with rest. She had associated numbness/tingling to her bilateral hands/ finger tips. Given multiple SL NTG in the ED with minimal improvement in symptoms, chest pain later resolved after nitro patch placed. Patient also reports intermittent slurred speech and word finding difficulty for the past 3 months and feels like she may be having recurrent mini strokes, last episode occurred a few minutes prior to my exam while in the ED. No associated facial droop or unilateral weakness. She denies fever, chills, diaphoresis, N/V, abdominal pain, SOB, HA, vision changes, or syncope.   "

## 2022-01-14 NOTE — ASSESSMENT & PLAN NOTE
- BG goal 140 - 180   - repeat A1c  - home regimen: levemir 90U qhs + 20U novolog TIDWM  - inpatient regimen: detemir 25U BID + aspart 8U TIDWM   - SRI GAY

## 2022-01-14 NOTE — H&P
"[facility] - Emergency Dept  [facility]  History & Physical    Patient Name: Debbie Metcalf  MRN: [id number]  Patient Class: IP- Inpatient  Admission Date: 1/13/2022  Attending Physician: Reno Souza*   Primary Care Provider: MEÑO Abdalla         Patient information was obtained from patient, past medical records and ER records.     Subjective:     Principal Problem:NSTEMI (non-ST elevated myocardial infarction)    Chief Complaint:   Chief Complaint   Patient presents with    Chest Pain     X 30 min. 1 nitro and 1 asa given.         HPI: Debbie Metcalf is a 76 y.o. female with a PMHx of HTN, HLD, T2DM, diabetic neuropathy who is being admitted to hospital medicine for NSTEMI. Patient presented to [facility] with complaints of acute onset left sided "dull, achy" chest pain with associated radiation to her left shoulder/arm that woke her up from a nap around 2:30pm today. The pain worsened with exertion and minimally improved with rest. She had associated numbness/tingling to her bilateral hands/ finger tips. Given multiple SL NTG in the ED with minimal improvement in symptoms, chest pain later resolved after nitro patch placed. Patient also reports intermittent slurred speech and word finding difficulty for the past 3 months and feels like she may be having recurrent mini strokes, last episode occurred a few minutes prior to my exam while in the ED. No associated facial droop or unilateral weakness. She denies fever, chills, diaphoresis, N/V, abdominal pain, SOB, HA, vision changes, or syncope.     ED: hypertensive to max /90, remaining vitals stable. BNP 16, Troponin 2.9>>16.249. EKG shows left anterior fascicular block but no acute STEMI. Cardiology consulted and recommend starting ACS protocol for NSEMI. Given ASA 324mg and Plavix 300mg x1.       Past Medical History:   Diagnosis Date    Diabetes mellitus        Past Surgical History:   Procedure Laterality Date    EYE SURGERY   "    gender reaffirmation         Review of patient's allergies indicates:  No Known Allergies    No current facility-administered medications on file prior to encounter.     Current Outpatient Medications on File Prior to Encounter   Medication Sig    atorvastatin (LIPITOR) 20 MG tablet Take 20 mg by mouth once daily.    estradioL (ESTRACE) 2 MG tablet Take 2 mg by mouth 2 (two) times a day.    insulin aspart U-100 (NOVOLOG) 100 unit/mL injection Inject into the skin 3 (three) times daily before meals.    insulin detemir U-100 (LEVEMIR) 100 unit/mL injection Inject 100 Units into the skin every evening.    LANCETS MISC by Misc.(Non-Drug; Combo Route) route.    minoxidil (ROGAINE TOP) Apply topically.    sodium chloride 5% (SAKINA 128) 5 % ophthalmic solution Place 1 drop into both eyes 3 (three) times daily.    spironolactone (ALDACTONE) 25 MG tablet Take 25 mg by mouth once daily.     Family History    Mother- lung cancer  Father- DM, stroke       Tobacco Use    Smoking status: Never Smoker    Smokeless tobacco: Never Used   Substance and Sexual Activity    Alcohol use: Never    Drug use: Never    Sexual activity: Not on file     Review of Systems   Constitutional: Negative for activity change, appetite change, chills and fever.   HENT: Negative for congestion and voice change.    Eyes: Negative for photophobia and visual disturbance.   Respiratory: Negative for cough, chest tightness, shortness of breath and wheezing.    Cardiovascular: Positive for chest pain. Negative for palpitations and leg swelling.   Gastrointestinal: Negative for abdominal distention, abdominal pain, blood in stool, diarrhea, nausea and vomiting.   Endocrine: Negative for polyphagia and polyuria.   Genitourinary: Negative for decreased urine volume, dysuria, flank pain, frequency and hematuria.   Musculoskeletal: Positive for back pain (chronic). Negative for arthralgias.   Skin: Negative for color change and wound.    Neurological: Positive for speech difficulty and numbness. Negative for dizziness, syncope, facial asymmetry, weakness, light-headedness and headaches.   Psychiatric/Behavioral: Negative for confusion, decreased concentration and dysphoric mood.     Objective:     Vital Signs (Most Recent):  Temp: 97.5 °F (36.4 °C) (01/13/22 1712)  Pulse: 86 (01/13/22 2232)  Resp: (!) 22 (01/13/22 2101)  BP: 134/77 (01/13/22 2232)  SpO2: 95 % (01/13/22 2232) Vital Signs (24h Range):  Temp:  [97.5 °F (36.4 °C)] 97.5 °F (36.4 °C)  Pulse:  [86-94] 86  Resp:  [19-22] 22  SpO2:  [95 %-100 %] 95 %  BP: (134-182)/(77-90) 134/77     Weight: 103 kg (227 lb)  Body mass index is 29.95 kg/m².    Physical Exam  Vitals and nursing note reviewed.   Constitutional:       General: She is not in acute distress.     Appearance: She is well-developed. She is obese.   HENT:      Head: Normocephalic and atraumatic.      Mouth/Throat:      Pharynx: No oropharyngeal exudate.   Eyes:      General: No scleral icterus.     Extraocular Movements: Extraocular movements intact.      Conjunctiva/sclera: Conjunctivae normal.   Cardiovascular:      Rate and Rhythm: Normal rate and regular rhythm.      Heart sounds: Normal heart sounds.   Pulmonary:      Effort: Pulmonary effort is normal. No respiratory distress.      Breath sounds: Normal breath sounds. No wheezing.   Abdominal:      General: Bowel sounds are normal. There is no distension.      Palpations: Abdomen is soft.      Tenderness: There is no abdominal tenderness.   Musculoskeletal:         General: No tenderness. Normal range of motion.      Cervical back: Normal range of motion and neck supple.      Right lower leg: No edema.      Left lower leg: No edema.   Lymphadenopathy:      Cervical: No cervical adenopathy.   Skin:     General: Skin is warm and dry.      Capillary Refill: Capillary refill takes less than 2 seconds.      Findings: No rash.   Neurological:      Mental Status: She is alert and  oriented to person, place, and time.      Cranial Nerves: No cranial nerve deficit.      Sensory: No sensory deficit.      Coordination: Coordination normal.   Psychiatric:         Behavior: Behavior normal.         Thought Content: Thought content normal.         Judgment: Judgment normal.             Significant Labs:   All pertinent labs within the past 24 hours have been reviewed.  CBC:   Recent Labs   Lab 01/13/22 1912 01/13/22 2150   WBC 11.53 11.95   HGB 14.0 13.1   HCT 42.5 41.2    252     CMP:   Recent Labs   Lab 01/13/22 1912   *   K 4.1      CO2 23   *   BUN 11   CREATININE 1.1   CALCIUM 9.3   PROT 7.2   ALBUMIN 3.6   BILITOT 0.6   ALKPHOS 99   AST 23   ALT 10   ANIONGAP 11   EGFRNONAA 49.6*     Troponin:   Recent Labs   Lab 01/13/22 1912 01/13/22 2050   TROPONINI 2.900* 16.249*       Significant Imaging: I have reviewed all pertinent imaging results/findings within the past 24 hours.   X-Ray Chest AP Portable  Narrative: EXAMINATION:  XR CHEST AP PORTABLE    CLINICAL HISTORY:  Chest pain, unspecified    TECHNIQUE:  Single frontal view of the chest was performed.    COMPARISON:  None    FINDINGS:  Lordotic positioning.  Underinflated lungs with hypoventilatory change.    No consolidation, pleural effusion or pneumothorax.    Cardiomediastinal silhouette is unremarkable.  Impression: No acute findings in the chest.    Underinflated lungs with hypoventilatory change.  Lordotic positioning, limiting assessment of the lung bases.    Electronically signed by: Jose Hanks MD  Date:    01/13/2022  Time:    21:16        Assessment/Plan:     * NSTEMI (non-ST elevated myocardial infarction)  - NSTEMI/UA Pathway initiated  - EKG without acute STEMI  - Tn 2.9>>16.3>> trend until peak  - continue ACS protocol with heparin gtt, ASA, and Plavix  - check 2D echo  - lipid panel, HbA1c in AM  - continue atorvastatin, start Metoprolol and Losartan  - prn NTG for chest pain  - telemetry   -  Cardiology/Interventional Cards consulted; appreciate assistance   - NPO at midnight for possible [facility]    Hypertension  - continue spironolactone 25mg daily  - add MTP 25mg BID and Losartan 12.5mg daily; up titrate for goal HR<60 and SBP<130    Hyperlipidemia  - lipid panel in AM  - increase atorvastatin from 20 to 80mg daily    Type 2 Diabetes Mellitus, with long-term use of insulin   - BG goal 140 - 180   - home regimen: levemir 90U qhs + 20U novolog TIDWM  - inpatient regimen: detemir 25U BID + aspart 8U TIDWM +LDSSI  - ACHS accuchecks  - repeat A1c    Dysarthria  - reports intermittent slurred speech/ word finding difficulty x3 months  - possibly TIAs?  - check CTH    VTE Risk Mitigation (From admission, onward)         Ordered     heparin 25,000 units in dextrose 5% (100 units/ml) IV bolus from bag - ADDITIONAL PRN BOLUS - 60 units/kg (max bolus 4000 units)  As needed (PRN)        Question:  Heparin Infusion Adjustment (DO NOT MODIFY ANSWER)  Answer:  \\[facility]\epic\Images\Pharmacy\HeparinInfusions\heparin LOW INTENSITY nomogram for [facility] [id number].pdf    01/13/22 2139     heparin 25,000 units in dextrose 5% (100 units/ml) IV bolus from bag - ADDITIONAL PRN BOLUS - 30 units/kg (max bolus 4000 units)  As needed (PRN)        Question:  Heparin Infusion Adjustment (DO NOT MODIFY ANSWER)  Answer:  \\[email or web address]\epic\Images\Pharmacy\HeparinInfusions\heparin LOW INTENSITY nomogram for [facility] [id number].pdf    01/13/22 2139     Reason for No Pharmacological VTE Prophylaxis  Once        Question:  Reasons:  Answer:  IV Heparin w/in 24 hrs. Pre or Post-Op    01/13/22 2311     IP VTE HIGH RISK PATIENT  Once         01/13/22 2311     Place sequential compression device  Until discontinued         01/13/22 2259     heparin 25,000 units in dextrose 5% 250 mL (100 units/mL) infusion LOW INTENSITY nomogram - [facility]  Continuous        Question Answer Comment   Heparin Infusion Adjustment (DO NOT MODIFY ANSWER)  [email or web address]\epic\Images\Pharmacy\HeparinInfusions\heparin LOW INTENSITY nomogram for [facility] [id number].pdf    Begin at (in units/kg/hr) 12 01/13/22 5787                   Anat Singh PA-C  Department of Hospital Medicine   [facility] - Emergency Dept

## 2022-01-14 NOTE — SUBJECTIVE & OBJECTIVE
Past Medical History:   Diagnosis Date    Diabetes mellitus        Past Surgical History:   Procedure Laterality Date    EYE SURGERY      gender reaffirmation         Review of patient's allergies indicates:  No Known Allergies    No current facility-administered medications on file prior to encounter.     Current Outpatient Medications on File Prior to Encounter   Medication Sig    atorvastatin (LIPITOR) 20 MG tablet Take 20 mg by mouth once daily.    estradioL (ESTRACE) 2 MG tablet Take 2 mg by mouth 2 (two) times a day.    insulin aspart U-100 (NOVOLOG) 100 unit/mL injection Inject into the skin 3 (three) times daily before meals.    insulin detemir U-100 (LEVEMIR) 100 unit/mL injection Inject 100 Units into the skin every evening.    LANCETS MISC by Misc.(Non-Drug; Combo Route) route.    minoxidil (ROGAINE TOP) Apply topically.    sodium chloride 5% (SAKINA 128) 5 % ophthalmic solution Place 1 drop into both eyes 3 (three) times daily.    spironolactone (ALDACTONE) 25 MG tablet Take 25 mg by mouth once daily.     Family History    None       Tobacco Use    Smoking status: Never Smoker    Smokeless tobacco: Never Used   Substance and Sexual Activity    Alcohol use: Never    Drug use: Never    Sexual activity: Not on file     Review of Systems   Constitutional: Negative for chills and fever.   Cardiovascular: Positive for chest pain and dyspnea on exertion. Negative for irregular heartbeat, near-syncope and syncope.   Respiratory: Negative for shortness of breath.    Gastrointestinal: Negative for nausea.   Neurological: Negative for headaches and weakness.   Psychiatric/Behavioral: Negative for altered mental status.     Objective:     Vital Signs (Most Recent):  Temp: 97.5 °F (36.4 °C) (01/13/22 1712)  Pulse: 86 (01/13/22 2232)  Resp: (!) 22 (01/13/22 2101)  BP: 134/77 (01/13/22 2232)  SpO2: 95 % (01/13/22 2232) Vital Signs (24h Range):  Temp:  [97.5 °F (36.4 °C)] 97.5 °F (36.4 °C)  Pulse:  [86-94]  86  Resp:  [19-22] 22  SpO2:  [95 %-100 %] 95 %  BP: (134-182)/(77-90) 134/77     Weight: 103 kg (227 lb)  Body mass index is 29.95 kg/m².    SpO2: 95 %  O2 Device (Oxygen Therapy): room air    No intake or output data in the 24 hours ending 01/13/22 2351    Lines/Drains/Airways     Peripheral Intravenous Line                 Peripheral IV - Single Lumen 01/13/22 1910 20 G Right Antecubital <1 day                Physical Exam  Vitals reviewed.   Constitutional:       Appearance: She is well-developed and well-nourished.   HENT:      Head: Normocephalic and atraumatic.   Eyes:      Pupils: Pupils are equal, round, and reactive to light.   Neck:      Vascular: No JVD.   Cardiovascular:      Rate and Rhythm: Normal rate and regular rhythm.      Heart sounds: No murmur heard.      Pulmonary:      Effort: Pulmonary effort is normal. No respiratory distress.      Breath sounds: Normal breath sounds.   Abdominal:      General: Bowel sounds are normal. There is no distension.      Palpations: Abdomen is soft.      Tenderness: There is no abdominal tenderness.   Musculoskeletal:         General: No edema.   Skin:     General: Skin is warm and dry.      Findings: No erythema.   Neurological:      Mental Status: She is alert and oriented to person, place, and time.   Psychiatric:         Behavior: Behavior normal.         Thought Content: Thought content normal.         Judgment: Judgment normal.         Significant Labs:   CMP   Recent Labs   Lab 01/13/22 1912   *   K 4.1      CO2 23   *   BUN 11   CREATININE 1.1   CALCIUM 9.3   PROT 7.2   ALBUMIN 3.6   BILITOT 0.6   ALKPHOS 99   AST 23   ALT 10   ANIONGAP 11   ESTGFRAFRICA 57.2*   EGFRNONAA 49.6*    and CBC   Recent Labs   Lab 01/13/22 1912 01/13/22 1912 01/13/22  2150   WBC 11.53  --  11.95   HGB 14.0  --  13.1   HCT 42.5   < > 41.2     --  252    < > = values in this interval not displayed.       Significant Imaging: All pertinent imaging  reviewed

## 2022-01-14 NOTE — ASSESSMENT & PLAN NOTE
- Patient presents with NSTEMI, NILDA 116, CHEIKH 3 pts, currently hemodynamically stable and chest pain free  - admit to Hospital Medicine  - start heparin ACS protocol, and continue for 48 hours  - load with DAPT and continue for at least 1 year  - continue cardio-selective beta blocker (metoprolol/carvedilol) and titrate to target HR 60  - increased home atorvastatin 20 mg to 80mg daily  - start low dose ARB (losartan 12.5) and uptitrate anti-hypertensives as tolerated for target SBP <130  - follow up formal ECHO to assess LV/RV function  - follow up lipid panel  - trend troponins x3  - serial ECG as needed for chest pain  - consult interventional cardiology for cardiac catheterization

## 2022-01-14 NOTE — CONSULTS
"[facility] - Emergency Dept  Cardiology  Consult Note    Patient Name: Debbie Metcalf  MRN: [id number]  Admission Date: 1/13/2022  Hospital Length of Stay: 0 days  Code Status: Full Code   Attending Provider: Reno Souza*   Consulting Provider: Sujatha Lopez MD  Primary Care Physician: MEÑO Abdalla  Principal Problem:NSTEMI (non-ST elevated myocardial infarction)    Patient information was obtained from patient, past medical records and ER records.     Inpatient consult to Cardiology  Consult performed by: Sujatha Lopez MD  Consult ordered by: Nikkie Guevara MD        Subjective:     Chief Complaint:  Chest pain     HPI:   Ms Debbie Metcalf ("Sandra") is a 75 y/o F with T2DM, diabetic neuropathy, HTN, HLD, chronic back pain 2/2 scoliosis who presents with chest pain.     Pt reports being woken up from afternoon nap ~ 2:30 bpm with acute onset pain starting in her left arm and radiating to her left shoulder and left chest. She is unable to characterize nature of pain but reports it was associated with tingling. BGL at home 360 and she took 25U insulin. Her BGL came down but her pain remained and she called EMS to bring to the ER. Enroute, she was given SL NTG and aspirin, which helped alleviate the pain.     In the ER, /90, HR 88 bpm, sating 99% on RA. CBC wnl, CMP wnl. Trop 2.9, CXR wnl. ECG notable for sinus rhythm, 1st AVB, Q-waves septal and lateral leads. No prior ECGs available for comparison. On bedside Echo, LVEF appears 55-60% with no WMA. RV wnl. Pt was not having active chest pain at the time of interview.    No prior history of chest pain. Does not exercise regularly but reports SOBOE for last 2 months with walking up flights of stairs or running after grandchildren. Started developing fluid in legs bilaterally 2 years ago for which she was started on aldactone. No prior TTE.     No FHX of premature CAD. No tobacco use, alcohol or other drug use. Worked as a  " prior to senior living.       Past Medical History:   Diagnosis Date    Diabetes mellitus        Past Surgical History:   Procedure Laterality Date    EYE SURGERY      gender reaffirmation         Review of patient's allergies indicates:  No Known Allergies    No current facility-administered medications on file prior to encounter.     Current Outpatient Medications on File Prior to Encounter   Medication Sig    atorvastatin (LIPITOR) 20 MG tablet Take 20 mg by mouth once daily.    estradioL (ESTRACE) 2 MG tablet Take 2 mg by mouth 2 (two) times a day.    insulin aspart U-100 (NOVOLOG) 100 unit/mL injection Inject into the skin 3 (three) times daily before meals.    insulin detemir U-100 (LEVEMIR) 100 unit/mL injection Inject 100 Units into the skin every evening.    LANCETS MISC by Misc.(Non-Drug; Combo Route) route.    minoxidil (ROGAINE TOP) Apply topically.    sodium chloride 5% (SAKINA 128) 5 % ophthalmic solution Place 1 drop into both eyes 3 (three) times daily.    spironolactone (ALDACTONE) 25 MG tablet Take 25 mg by mouth once daily.     Family History    None       Tobacco Use    Smoking status: Never Smoker    Smokeless tobacco: Never Used   Substance and Sexual Activity    Alcohol use: Never    Drug use: Never    Sexual activity: Not on file     Review of Systems   Constitutional: Negative for chills and fever.   Cardiovascular: Positive for chest pain and dyspnea on exertion. Negative for irregular heartbeat, near-syncope and syncope.   Respiratory: Negative for shortness of breath.    Gastrointestinal: Negative for nausea.   Neurological: Negative for headaches and weakness.   Psychiatric/Behavioral: Negative for altered mental status.     Objective:     Vital Signs (Most Recent):  Temp: 97.5 °F (36.4 °C) (01/13/22 1712)  Pulse: 86 (01/13/22 2232)  Resp: (!) 22 (01/13/22 2101)  BP: 134/77 (01/13/22 2232)  SpO2: 95 % (01/13/22 2232) Vital Signs (24h Range):  Temp:  [97.5 °F (36.4 °C)] 97.5 °F  (36.4 °C)  Pulse:  [86-94] 86  Resp:  [19-22] 22  SpO2:  [95 %-100 %] 95 %  BP: (134-182)/(77-90) 134/77     Weight: 103 kg (227 lb)  Body mass index is 29.95 kg/m².    SpO2: 95 %  O2 Device (Oxygen Therapy): room air    No intake or output data in the 24 hours ending 01/13/22 2351    Lines/Drains/Airways     Peripheral Intravenous Line                 Peripheral IV - Single Lumen 01/13/22 1910 20 G Right Antecubital <1 day                Physical Exam  Vitals reviewed.   Constitutional:       Appearance: She is well-developed and well-nourished.   HENT:      Head: Normocephalic and atraumatic.   Eyes:      Pupils: Pupils are equal, round, and reactive to light.   Neck:      Vascular: No JVD.   Cardiovascular:      Rate and Rhythm: Normal rate and regular rhythm.      Heart sounds: No murmur heard.      Pulmonary:      Effort: Pulmonary effort is normal. No respiratory distress.      Breath sounds: Normal breath sounds.   Abdominal:      General: Bowel sounds are normal. There is no distension.      Palpations: Abdomen is soft.      Tenderness: There is no abdominal tenderness.   Musculoskeletal:         General: No edema.   Skin:     General: Skin is warm and dry.      Findings: No erythema.   Neurological:      Mental Status: She is alert and oriented to person, place, and time.   Psychiatric:         Behavior: Behavior normal.         Thought Content: Thought content normal.         Judgment: Judgment normal.         Significant Labs:   CMP   Recent Labs   Lab 01/13/22 1912   *   K 4.1      CO2 23   *   BUN 11   CREATININE 1.1   CALCIUM 9.3   PROT 7.2   ALBUMIN 3.6   BILITOT 0.6   ALKPHOS 99   AST 23   ALT 10   ANIONGAP 11   ESTGFRAFRICA 57.2*   EGFRNONAA 49.6*    and CBC   Recent Labs   Lab 01/13/22 1912 01/13/22 1912 01/13/22 2150   WBC 11.53  --  11.95   HGB 14.0  --  13.1   HCT 42.5   < > 41.2     --  252    < > = values in this interval not displayed.       Significant Imaging:  All pertinent imaging reviewed    Assessment and Plan:     * NSTEMI (non-ST elevated myocardial infarction)  - Patient presents with NSTEMI, NILDA 116, CHEIKH 3 pts, currently hemodynamically stable and chest pain free  - admit to Hospital Medicine  - start heparin ACS protocol, and continue for 48 hours  - load with DAPT and continue for at least 1 year  - continue cardio-selective beta blocker (metoprolol/carvedilol) and titrate to target HR 60  - increased home atorvastatin 20 mg to 80mg daily  - start low dose ARB (losartan 12.5) and uptitrate anti-hypertensives as tolerated for target SBP <130  - follow up formal ECHO to assess LV/RV function  - follow up lipid panel, A1c  - trend troponins x3  - serial ECG as needed for chest pain  - consult interventional cardiology for cardiac catheterization    VTE Risk Mitigation (From admission, onward)         Ordered     heparin 25,000 units in dextrose 5% (100 units/ml) IV bolus from bag - ADDITIONAL PRN BOLUS - 60 units/kg (max bolus 4000 units)  As needed (PRN)        Question:  Heparin Infusion Adjustment (DO NOT MODIFY ANSWER)  Answer:  \[email or web address]\Images\Pharmacy\HeparinInfusions\heparin LOW INTENSITY nomogram for [facility] [id number].pdf    01/13/22 2139     heparin 25,000 units in dextrose 5% (100 units/ml) IV bolus from bag - ADDITIONAL PRN BOLUS - 30 units/kg (max bolus 4000 units)  As needed (PRN)        Question:  Heparin Infusion Adjustment (DO NOT MODIFY ANSWER)  Answer:  \[email or web address]\epic\Images\Pharmacy\HeparinInfusions\heparin LOW INTENSITY nomogram for [facility] [id number].pdf    01/13/22 2139     Reason for No Pharmacological VTE Prophylaxis  Once        Question:  Reasons:  Answer:  IV Heparin w/in 24 hrs. Pre or Post-Op    01/13/22 2311     IP VTE HIGH RISK PATIENT  Once         01/13/22 2311     Place sequential compression device  Until discontinued         01/13/22 4429     heparin 25,000 units in dextrose 5% 250 mL (100 units/mL) infusion LOW INTENSITY  nomogram - [facility]  Continuous        Question Answer Comment   Heparin Infusion Adjustment (DO NOT MODIFY ANSWER) [email or web address]\epic\Images\Pharmacy\HeparinInfusions\heparin LOW INTENSITY nomogram for [facility] [id number].pdf    Begin at (in units/kg/hr) 12 01/13/22 2712                Thank you for your consult. I will follow-up with patient. Please contact us if you have any additional questions.    Sujatha Lopez MD  Cardiology   [facility] - Emergency Dept

## 2022-01-14 NOTE — ASSESSMENT & PLAN NOTE
- continue spironolactone 25mg daily  - add MTP 25mg BID and Losartan 12.5mg daily; up titrate for goal HR<60 and SBP<130

## 2022-01-14 NOTE — NURSING
Pt arrives to unit vi W/C, RESP EVEN AND UNLABORED, NO C/O CHEST PAIN, PT ABLE TO AMBULATE INDEPENDENTLY. NO C/O CHEST PAIN, HEPARIN FOZIA ALFONSO AT 12U, WILL ADJUST AS LAD DRAWS  
No Vaccines Administered.

## 2022-01-15 VITALS
SYSTOLIC BLOOD PRESSURE: 102 MMHG | WEIGHT: 224.88 LBS | TEMPERATURE: 98 F | RESPIRATION RATE: 18 BRPM | HEART RATE: 63 BPM | BODY MASS INDEX: 29.67 KG/M2 | DIASTOLIC BLOOD PRESSURE: 57 MMHG | OXYGEN SATURATION: 96 %

## 2022-01-15 LAB
ANION GAP SERPL CALC-SCNC: 8 MMOL/L (ref 8–16)
BASOPHILS # BLD AUTO: 0.05 K/UL (ref 0–0.2)
BASOPHILS NFR BLD: 0.5 % (ref 0–1.9)
BUN SERPL-MCNC: 14 MG/DL (ref 8–23)
CALCIUM SERPL-MCNC: 9 MG/DL (ref 8.7–10.5)
CHLORIDE SERPL-SCNC: 102 MMOL/L (ref 95–110)
CO2 SERPL-SCNC: 22 MMOL/L (ref 23–29)
CREAT SERPL-MCNC: 0.9 MG/DL (ref 0.5–1.4)
DIFFERENTIAL METHOD: NORMAL
EOSINOPHIL # BLD AUTO: 0.2 K/UL (ref 0–0.5)
EOSINOPHIL NFR BLD: 1.7 % (ref 0–8)
ERYTHROCYTE [DISTWIDTH] IN BLOOD BY AUTOMATED COUNT: 12.3 % (ref 11.5–14.5)
EST. GFR  (AFRICAN AMERICAN): >60 ML/MIN/1.73 M^2
EST. GFR  (NON AFRICAN AMERICAN): >60 ML/MIN/1.73 M^2
GLUCOSE SERPL-MCNC: 171 MG/DL (ref 70–110)
HCT VFR BLD AUTO: 40 % (ref 37–48.5)
HGB BLD-MCNC: 13.1 G/DL (ref 12–16)
IMM GRANULOCYTES # BLD AUTO: 0.04 K/UL (ref 0–0.04)
IMM GRANULOCYTES NFR BLD AUTO: 0.4 % (ref 0–0.5)
LYMPHOCYTES # BLD AUTO: 3.3 K/UL (ref 1–4.8)
LYMPHOCYTES NFR BLD: 31.5 % (ref 18–48)
MAGNESIUM SERPL-MCNC: 1.9 MG/DL (ref 1.6–2.6)
MCH RBC QN AUTO: 29.6 PG (ref 27–31)
MCHC RBC AUTO-ENTMCNC: 32.8 G/DL (ref 32–36)
MCV RBC AUTO: 90 FL (ref 82–98)
MONOCYTES # BLD AUTO: 0.9 K/UL (ref 0.3–1)
MONOCYTES NFR BLD: 8.6 % (ref 4–15)
NEUTROPHILS # BLD AUTO: 6.1 K/UL (ref 1.8–7.7)
NEUTROPHILS NFR BLD: 57.3 % (ref 38–73)
NRBC BLD-RTO: 0 /100 WBC
PLATELET # BLD AUTO: 256 K/UL (ref 150–450)
PMV BLD AUTO: 9.2 FL (ref 9.2–12.9)
POCT GLUCOSE: 166 MG/DL (ref 70–110)
POCT GLUCOSE: 190 MG/DL (ref 70–110)
POCT GLUCOSE: 234 MG/DL (ref 70–110)
POTASSIUM SERPL-SCNC: 3.9 MMOL/L (ref 3.5–5.1)
RBC # BLD AUTO: 4.43 M/UL (ref 4–5.4)
SODIUM SERPL-SCNC: 132 MMOL/L (ref 136–145)
WBC # BLD AUTO: 10.6 K/UL (ref 3.9–12.7)

## 2022-01-15 PROCEDURE — 99239 PR HOSPITAL DISCHARGE DAY,>30 MIN: ICD-10-PCS | Mod: ,,, | Performed by: STUDENT IN AN ORGANIZED HEALTH CARE EDUCATION/TRAINING PROGRAM

## 2022-01-15 PROCEDURE — 99239 HOSP IP/OBS DSCHRG MGMT >30: CPT | Mod: ,,, | Performed by: STUDENT IN AN ORGANIZED HEALTH CARE EDUCATION/TRAINING PROGRAM

## 2022-01-15 PROCEDURE — 36415 COLL VENOUS BLD VENIPUNCTURE: CPT | Performed by: STUDENT IN AN ORGANIZED HEALTH CARE EDUCATION/TRAINING PROGRAM

## 2022-01-15 PROCEDURE — 80048 BASIC METABOLIC PNL TOTAL CA: CPT | Performed by: STUDENT IN AN ORGANIZED HEALTH CARE EDUCATION/TRAINING PROGRAM

## 2022-01-15 PROCEDURE — 85025 COMPLETE CBC W/AUTO DIFF WBC: CPT | Performed by: STUDENT IN AN ORGANIZED HEALTH CARE EDUCATION/TRAINING PROGRAM

## 2022-01-15 PROCEDURE — 25000003 PHARM REV CODE 250: Performed by: PHYSICIAN ASSISTANT

## 2022-01-15 PROCEDURE — 94761 N-INVAS EAR/PLS OXIMETRY MLT: CPT

## 2022-01-15 RX ORDER — NAPROXEN SODIUM 220 MG/1
81 TABLET, FILM COATED ORAL DAILY
Qty: 90 TABLET | Refills: 3 | Status: SHIPPED | OUTPATIENT
Start: 2022-01-15 | End: 2023-01-15

## 2022-01-15 RX ORDER — LOSARTAN POTASSIUM 25 MG/1
12.5 TABLET ORAL DAILY
Qty: 45 TABLET | Refills: 3 | Status: SHIPPED | OUTPATIENT
Start: 2022-01-15 | End: 2023-01-15

## 2022-01-15 RX ORDER — CLOPIDOGREL BISULFATE 75 MG/1
75 TABLET ORAL DAILY
Qty: 30 TABLET | Refills: 11 | Status: SHIPPED | OUTPATIENT
Start: 2022-01-15 | End: 2023-01-15

## 2022-01-15 RX ORDER — METOPROLOL TARTRATE 25 MG/1
25 TABLET, FILM COATED ORAL 2 TIMES DAILY
Qty: 60 TABLET | Refills: 11 | Status: SHIPPED | OUTPATIENT
Start: 2022-01-15 | End: 2023-01-15

## 2022-01-15 RX ORDER — ATORVASTATIN CALCIUM 40 MG/1
40 TABLET, FILM COATED ORAL DAILY
Qty: 90 TABLET | Refills: 3 | Status: SHIPPED | OUTPATIENT
Start: 2022-01-15 | End: 2023-01-15

## 2022-01-15 RX ADMIN — ONDANSETRON 8 MG: 8 TABLET, ORALLY DISINTEGRATING ORAL at 02:01

## 2022-01-15 RX ADMIN — METOPROLOL TARTRATE 25 MG: 25 TABLET, FILM COATED ORAL at 08:01

## 2022-01-15 RX ADMIN — INSULIN ASPART 8 UNITS: 100 INJECTION, SOLUTION INTRAVENOUS; SUBCUTANEOUS at 12:01

## 2022-01-15 RX ADMIN — ASPIRIN 81 MG CHEWABLE TABLET 81 MG: 81 TABLET CHEWABLE at 08:01

## 2022-01-15 RX ADMIN — LOSARTAN POTASSIUM 12.5 MG: 25 TABLET, FILM COATED ORAL at 08:01

## 2022-01-15 RX ADMIN — CLOPIDOGREL 75 MG: 75 TABLET, FILM COATED ORAL at 08:01

## 2022-01-15 RX ADMIN — INSULIN ASPART 8 UNITS: 100 INJECTION, SOLUTION INTRAVENOUS; SUBCUTANEOUS at 09:01

## 2022-01-15 RX ADMIN — INSULIN DETEMIR 25 UNITS: 100 INJECTION, SOLUTION SUBCUTANEOUS at 08:01

## 2022-01-15 NOTE — PLAN OF CARE
Pt verbalized understanding of plan of care.  Informed patient of risk of falling.  Discussed fall prevention strategies. Bed locked and low.  Call light and personal items within reach. SR up x 2.  BG monitoring ordered before meals.  Scheduled and SSI admin as ordered.  S/p LHC w PCI.

## 2022-01-15 NOTE — PLAN OF CARE
VSS. BG monitored. Pt given discharge instruction, medication reviewed with the pt, follow up appts reviewed. All questions and concerns addressed. Pt verbalized understanding. IV, telemetry removed. Discharge paperwork given to patient. Pt in room waiting for transport.

## 2022-01-16 NOTE — DISCHARGE SUMMARY
"[facility] - Cardiology [facility] Medicine  Discharge Summary      Patient Name: Debbie Metcalf  MRN: [id number]  Patient Class: IP- Inpatient  Admission Date: 1/13/2022  Hospital Length of Stay: 2 days  Discharge Date and Time:  01/16/2022 4:22 PM  Attending Physician: No att. providers found   Discharging Provider: Reno Bull MD  Primary Care Provider: MEÑO Abdalla      HPI:   Debbie Metcalf is a 76 y.o. female with a PMHx of HTN, HLD, T2DM, diabetic neuropathy who is being admitted to hospital medicine for NSTEMI. Patient presented to [facility] with complaints of acute onset left sided "dull, achy" chest pain with associated radiation to her left shoulder/arm that woke her up from a nap around 2:30pm today. The pain worsened with exertion and minimally improved with rest. She had associated numbness/tingling to her bilateral hands/ finger tips. Given multiple SL NTG in the ED with minimal improvement in symptoms, chest pain later resolved after nitro patch placed. Patient also reports intermittent slurred speech and word finding difficulty for the past 3 months and feels like she may be having recurrent mini strokes, last episode occurred a few minutes prior to my exam while in the ED. No associated facial droop or unilateral weakness. She denies fever, chills, diaphoresis, N/V, abdominal pain, SOB, HA, vision changes, or syncope.       Procedure(s) (LRB):  Angiogram, Coronary, with Left Heart Cath (N/A)  IVUS, Coronary  Stent, Drug Eluting, Single Vessel, Coronary      Hospital Course:   In the ED, patient was hypertensive to max /90, remaining vitals stable. BNP 16, Troponin 2.9>>16.249. EKG with left anterior fascicular block but no acute STEMI. Cardiology consulted and recommend starting ACS protocol for NSEMI. Given ASA 324mg and Plavix 300mg x1 and heparin gtt. She was admitted to hospital medicine. Interventional cardiology was consulted and patient was taken to cath lab. " She underwent coronary angiogram with NITISH placed mLAD. Afterward she was chest pain free. Referral placed to cardiac rehab. She was discharged.      Physical Exam  Constitutional:       General: She is not in acute distress.     Appearance: Normal appearance. She is not toxic-appearing or diaphoretic.   Cardiovascular:      Rate and Rhythm: Normal rate and regular rhythm.      Heart sounds: No murmur heard.  No friction rub. No gallop.    Pulmonary:      Effort: Pulmonary effort is normal. No respiratory distress.      Breath sounds: Normal breath sounds. No wheezing or rales.   Abdominal:      General: Abdomen is flat. There is no distension.      Palpations: Abdomen is soft.      Tenderness: There is no abdominal tenderness. There is no guarding or rebound.   Musculoskeletal:      Right lower leg: No edema.      Left lower leg: No edema.   Neurological:      Mental Status: She is alert.       Goals of Care Treatment Preferences:  Code Status: Full Code      Consults:   Consults (From admission, onward)        Status Ordering Provider     Inpatient consult to Interventional Cardiology  Once        Provider:  (Not yet assigned)    Completed GERALD DYE     Inpatient consult to Cardiology  Once        Provider:  (Not yet assigned)    Completed JHOAN FAIR          No new Assessment & Plan notes have been filed under this hospital service since the last note was generated.  Service: Hospital Medicine    Final Active Diagnoses:    Diagnosis Date Noted POA    PRINCIPAL PROBLEM:  NSTEMI (non-ST elevated myocardial infarction) [I21.4] 01/13/2022 Yes    Dysarthria [R47.1] 01/14/2022 Yes    CAD S/P percutaneous coronary angioplasty [I25.10, Z98.61]  Not Applicable    Type 2 diabetes mellitus, with long-term current use of insulin [E11.9, Z79.4]  Not Applicable    Hypertension [I10]  Yes    Hyperlipidemia associated with type 2 diabetes mellitus [E11.69, E78.5]  Yes      Problems Resolved During this  Admission:       Discharged Condition: good    Disposition: Home or Self Care    Follow Up:    Patient Instructions:      Ambulatory referral/consult to Endocrinology   Standing Status: Future   Referral Priority: Routine Referral Type: Consultation   Requested Specialty: Endocrinology   Number of Visits Requested: 1     Ambulatory referral/consult to Cardiology   Standing Status: Future   Referral Priority: Routine Referral Type: Consultation   Referral Reason: Specialty Services Required   Requested Specialty: Cardiology   Number of Visits Requested: 1     Diet Cardiac     Diet diabetic     Notify your health care provider if you experience any of the following:  temperature >100.4     Notify your health care provider if you experience any of the following:  persistent nausea and vomiting or diarrhea     Notify your health care provider if you experience any of the following:  severe uncontrolled pain     Notify your health care provider if you experience any of the following:  redness, tenderness, or signs of infection (pain, swelling, redness, odor or green/yellow discharge around incision site)     Notify your health care provider if you experience any of the following:  difficulty breathing or increased cough     Notify your health care provider if you experience any of the following:  severe persistent headache     Notify your health care provider if you experience any of the following:  persistent dizziness, light-headedness, or visual disturbances     Notify your health care provider if you experience any of the following:  increased confusion or weakness     Activity as tolerated       Significant Diagnostic Studies: Labs: All labs within the past 24 hours have been reviewed    Pending Diagnostic Studies:     None         Medications:  Reconciled Home Medications:      Medication List      START taking these medications    aspirin 81 MG Chew  Chew and swallow 1 tablet (81 mg total) by mouth once daily.      clopidogreL 75 mg tablet  Commonly known as: PLAVIX  Take 1 tablet (75 mg total) by mouth once daily.     losartan 25 MG tablet  Commonly known as: COZAAR  Take 0.5 tablets (12.5 mg total) by mouth once daily.     metoprolol tartrate 25 MG tablet  Commonly known as: LOPRESSOR  Take 1 tablet (25 mg total) by mouth 2 (two) times daily.        CHANGE how you take these medications    atorvastatin 40 MG tablet  Commonly known as: LIPITOR  Take 1 tablet (40 mg total) by mouth once daily.  What changed:   · medication strength  · how much to take        CONTINUE taking these medications    estradioL 2 MG tablet  Commonly known as: ESTRACE  Take 2 mg by mouth 2 (two) times a day.     insulin aspart U-100 100 unit/mL injection  Commonly known as: NovoLOG  Inject into the skin 3 (three) times daily before meals.     insulin detemir U-100 100 unit/mL injection  Commonly known as: Levemir  Inject 100 Units into the skin every evening.     LANCETS MISC  by Misc.(Non-Drug; Combo Route) route.     ROGAINE TOP  Apply topically.     sodium chloride 5% 5 % ophthalmic solution  Commonly known as: SAKINA 128  Place 1 drop into both eyes 3 (three) times daily.     spironolactone 25 MG tablet  Commonly known as: ALDACTONE  Take 25 mg by mouth once daily.            Indwelling Lines/Drains at time of discharge:   Lines/Drains/Airways     None                 Time spent on the discharge of patient: 35 minutes         Reno Bull MD  Department of Hospital Medicine  [facility] - Cardiology Stepdown

## 2022-01-18 LAB
POC ACTIVATED CLOTTING TIME K: 136 SEC (ref 74–137)
POC ACTIVATED CLOTTING TIME K: 362 SEC (ref 74–137)
SAMPLE: ABNORMAL
SAMPLE: NORMAL

## 2022-02-02 ENCOUNTER — OFFICE VISIT (OUTPATIENT)
Dept: CARDIOLOGY | Facility: CLINIC | Age: 77
End: 2022-02-02
Payer: MEDICARE

## 2022-02-02 VITALS
BODY MASS INDEX: 28.7 KG/M2 | HEIGHT: 72 IN | OXYGEN SATURATION: 99 % | WEIGHT: 211.88 LBS | HEART RATE: 68 BPM | DIASTOLIC BLOOD PRESSURE: 72 MMHG | SYSTOLIC BLOOD PRESSURE: 130 MMHG

## 2022-02-02 DIAGNOSIS — E11.65 TYPE 2 DIABETES MELLITUS WITH HYPERGLYCEMIA, WITH LONG-TERM CURRENT USE OF INSULIN: ICD-10-CM

## 2022-02-02 DIAGNOSIS — I21.4 NSTEMI (NON-ST ELEVATED MYOCARDIAL INFARCTION): Primary | ICD-10-CM

## 2022-02-02 DIAGNOSIS — Z79.4 TYPE 2 DIABETES MELLITUS WITH HYPERGLYCEMIA, WITH LONG-TERM CURRENT USE OF INSULIN: ICD-10-CM

## 2022-02-02 DIAGNOSIS — I25.10 CAD S/P PERCUTANEOUS CORONARY ANGIOPLASTY: ICD-10-CM

## 2022-02-02 DIAGNOSIS — Z98.61 CAD S/P PERCUTANEOUS CORONARY ANGIOPLASTY: ICD-10-CM

## 2022-02-02 DIAGNOSIS — I10 PRIMARY HYPERTENSION: ICD-10-CM

## 2022-02-02 DIAGNOSIS — E78.5 HYPERLIPIDEMIA ASSOCIATED WITH TYPE 2 DIABETES MELLITUS: ICD-10-CM

## 2022-02-02 DIAGNOSIS — E11.69 HYPERLIPIDEMIA ASSOCIATED WITH TYPE 2 DIABETES MELLITUS: ICD-10-CM

## 2022-02-02 PROCEDURE — 99999 PR PBB SHADOW E&M-EST. PATIENT-LVL III: CPT | Mod: PBBFAC,,, | Performed by: INTERNAL MEDICINE

## 2022-02-02 PROCEDURE — 99999 PR PBB SHADOW E&M-EST. PATIENT-LVL III: ICD-10-PCS | Mod: PBBFAC,,, | Performed by: INTERNAL MEDICINE

## 2022-02-02 PROCEDURE — 99214 PR OFFICE/OUTPT VISIT, EST, LEVL IV, 30-39 MIN: ICD-10-PCS | Mod: S$PBB,,, | Performed by: INTERNAL MEDICINE

## 2022-02-02 PROCEDURE — 99213 OFFICE O/P EST LOW 20 MIN: CPT | Mod: PBBFAC | Performed by: INTERNAL MEDICINE

## 2022-02-02 PROCEDURE — 99214 OFFICE O/P EST MOD 30 MIN: CPT | Mod: S$PBB,,, | Performed by: INTERNAL MEDICINE

## 2022-02-02 RX ORDER — AMLODIPINE BESYLATE 5 MG/1
5 TABLET ORAL DAILY
COMMUNITY

## 2022-02-02 NOTE — PROGRESS NOTES
Interventional Cardiology/Vascular Intervention follow up  visit     PATIENT: Debbie Metcalf  MRN: [id number]   PCP: MEÑO Abdalla   02/02/2022     Thank you for asking me to see your patient Ms. Debbie Metcalf today for   Chief Complaint   Patient presents with    Coronary Artery Disease     Weakness, fatigue       History of Present Illness:   Ms. Debbie Metcalf is a 76 y.o. female  who I am evaluating for CAD.   She has significant medical problems including, HTN, HLD, T2DM, diabetic neuropathy   She was admitted with NSTEMI, 1/13/2022, s/p LAD PCI  Doing well after discharge, denies chest pain.     Chronic conditions  Additional history and information were obtained from chart   I reviewed last clinic and discharge summary  note for additional information, including medical history, progression of symptoms, treatment plan.      Past Medical History:   Diagnosis Date    Diabetes mellitus        Review of patient's allergies indicates:  No Known Allergies    Family History   Problem Relation Age of Onset    Lung cancer Mother     Stroke Father     Diabetes Father        Social History:  Social History     Tobacco Use    Smoking status: Never Smoker    Smokeless tobacco: Never Used   Substance Use Topics    Alcohol use: Never       Medications have been reviewed and reconciled.  Outpatient Medications Marked as Taking for the 2/2/22 encounter (Office Visit) with Tj Berry MD   Medication Sig Dispense Refill    aspirin 81 MG Chew Chew and swallow 1 tablet (81 mg total) by mouth once daily. 90 tablet 3    atorvastatin (LIPITOR) 40 MG tablet Take 1 tablet (40 mg total) by mouth once daily. 90 tablet 3    clopidogreL (PLAVIX) 75 mg tablet Take 1 tablet (75 mg total) by mouth once daily. 30 tablet 11    estradioL (ESTRACE) 2 MG tablet Take 2 mg by mouth 2 (two) times a day.      insulin aspart U-100 (NOVOLOG) 100 unit/mL injection Inject into the skin 3 (three) times daily  "before meals.      insulin detemir U-100 (LEVEMIR) 100 unit/mL injection Inject 100 Units into the skin every evening.      LANCETS MISC by Misc.(Non-Drug; Combo Route) route.      losartan (COZAAR) 25 MG tablet Take 0.5 tablets (12.5 mg total) by mouth once daily. 45 tablet 3    metoprolol tartrate (LOPRESSOR) 25 MG tablet Take 1 tablet (25 mg total) by mouth 2 (two) times daily. 60 tablet 11       Review of Systems:  Pertinent positive and negative as mentioned below, otherwise a full review of systems was negative.  Cardiac- No palpitations,  No syncope, No PND, No orthopnea,   Pulmonary- No cough, No wheezing, No stridor  Gastrointestinal- No dysphagia,  No vomiting, No constipation, No diarrhea    Physical Exam:  Vitals reviewed     BMI  Estimated body mass index is 29.67 kg/m² as calculated from the following:    Height as of 4/23/21: 6' 1" (1.854 m).    Weight as of 1/15/22: 102 kg (224 lb 13.9 oz).    Wt Readings from Last 3 Encounters:   01/15/22 102 kg (224 lb 13.9 oz)   04/23/21 108 kg (238 lb)   04/16/21 108 kg (238 lb 1.6 oz)     Temp Readings from Last 3 Encounters:   01/15/22 98 °F (36.7 °C) (Oral)   04/23/21 97.1 °F (36.2 °C)   04/18/21 98.9 °F (37.2 °C) (Oral)     BP Readings from Last 3 Encounters:   01/15/22 (!) 102/57   04/23/21 133/72   04/18/21 (!) 153/81     Pulse Readings from Last 3 Encounters:   01/15/22 63   04/23/21 68   04/18/21 63     General appearance: Alert, and oriented  Skin: Skin dry, no jaundice.  Head: Normocephalic, atraumatic     Neck: trachea midline, No JVD elevation   Eyes: No jaundice, No ptosis  Ear, nose, mouth, throat: Nasal septum not enlarged, oral mucosa moist without lesions, telangiectasias.  Cardiac: No visible PMI, Normal rate and rhythm, No murmur, No S3, S4, or rub  Lungs: Normal breath sound bilaterally, No rales, rhonchi, wheeze, rub  Abdomen: Soft, flat, non-tender, No hepatosplenomegaly.  Neuro: Alert, and oriented, no focal neurodeficit   Extremities: " No ischemia, No edema,  No varicosities   Hem/lymph/immuno: No pallor     Data/results:  Basic Metabolic Panel, reviewed today   No results found for this or any previous visit (from the past 336 hour(s)).  Lipid panel,  reviewed    Lab Results   Component Value Date    CHOL 152 01/14/2022     Lab Results   Component Value Date    HDL 57 01/14/2022     Lab Results   Component Value Date    LDLCALC 72.8 01/14/2022     Lab Results   Component Value Date    TRIG 111 01/14/2022     Lab Results   Component Value Date    CHOLHDL 37.5 01/14/2022       Laboratory Tests, reviewed today   Lab Results   Component Value Date    WBC 10.60 01/15/2022    HGB 13.1 01/15/2022    HCT 40.0 01/15/2022     01/15/2022    GRAN 6.1 01/15/2022    GRAN 57.3 01/15/2022       Lab Results   Component Value Date    HGBA1C 9.0 (H) 01/14/2022     Lab Results   Component Value Date    BNP 16 01/13/2022     No results found for: TSH  Lab Results   Component Value Date    INR 0.9 01/13/2022       Labs reviewed personally, reported, normal cratinine, normal platelet    Images       Coronary angiography findings/intervention (image personally reviewed)    Old records from the chart reviewed    Assessment and Plan/Medical decision making    Debbie Metcalf is a 76 y.o. y.o. female who presents for evaluation for Coronary Artery Disease (Weakness, fatigue)      1. NSTEMI (non-ST elevated myocardial infarction)    2. Primary hypertension    3. Hyperlipidemia associated with type 2 diabetes mellitus    4. CAD S/P percutaneous coronary angioplasty    5. Type 2 diabetes mellitus with hyperglycemia, with long-term current use of insulin      NSTEMI (non-ST elevated myocardial infarction)  No angina  Cont aspirin, plavix for at least 12 months     Hyperlipidemia associated with type 2 diabetes mellitus  Cont statin     Hypertension  Controlled  Cont same meds       Treatment plan was discussed with the patient during this visit, informed about  common side effects of medications.        Education Counseling:  Recommends healthy diet.   Recommends, at least 150 minutes of moderate aerobic activity a week.       Tj Berry MD, [facility]   Interventional Cardiology  Vascular Intervention   [facility] [address], [place] [address]  O: [phone]  F: [phone]

## 2022-06-27 ENCOUNTER — TELEPHONE (OUTPATIENT)
Dept: CARDIOLOGY | Facility: CLINIC | Age: 77
End: 2022-06-27
Payer: MEDICARE

## 2022-07-27 ENCOUNTER — TELEPHONE (OUTPATIENT)
Dept: NEPHROLOGY | Facility: CLINIC | Age: 77
End: 2022-07-27
Payer: MEDICARE

## 2022-07-27 DIAGNOSIS — I10 HYPERTENSION, UNSPECIFIED TYPE: Primary | ICD-10-CM

## 2022-07-27 NOTE — TELEPHONE ENCOUNTER
Spoke to pt and stated that she has no clue that DELMA Donaldson referred her to Nephrology and didn't schedule appt     ----- Message from Meli Carvalho sent at 7/27/2022 10:16 AM CDT -----  Regarding: Ext referral  Good morning,    Current pt is being referred from BAKARI Donaldson for CKD3. I have scanned the referral and records in to media mgr. Please contact pt to schedule and let me know if I can help any further.    Thank you,  Meli Carvalho  Clinic   Ext [phone]

## 2022-08-19 PROBLEM — I21.4 NSTEMI (NON-ST ELEVATED MYOCARDIAL INFARCTION): Status: RESOLVED | Noted: 2022-01-13 | Resolved: 2022-08-19

## 2022-08-19 PROBLEM — I25.5 ISCHEMIC CARDIOMYOPATHY: Status: ACTIVE | Noted: 2022-08-19

## 2022-08-19 NOTE — PROGRESS NOTES
Cardiology Clinic Note  Reason for Visit: CAD    HPI:     Debbie Metcalf is a 77 y.o. F, who presents for f/u CAD.    Follows with [facility].  She felt squeezing pain / pressure in L upper chest that would not resolve at the time of her MI in 1/2022.    She was on atorva 20mg at time of MI/last lab check.    No recurrence of anginal pain.  Sweeps floors, climbs stairs, wash dishes, laundry without angina.    No GI/ bleeding, no nosebleeds.    Medical: ischemic cardiomyopathy (EF 40%), CAD s/p NSTEMI/PCI to mLAD (1/14/22), HTN, HLD, DM2  Surgical: Reviewed, as below.  Family: Reviewed, as below. No premature CAD, HF, SCD.   Social: Reviewed, as below. Never smoked.    ROS:    Pertinent ROS included in HPI and below.  PMH:     Past Medical History:   Diagnosis Date    Diabetes mellitus      Past Surgical History:   Procedure Laterality Date    ANGIOGRAM, CORONARY, WITH LEFT HEART CATHETERIZATION N/A 1/14/2022    Procedure: Angiogram, Coronary, with Left Heart Cath;  Surgeon: Tj Berry MD;  [facility]: [facility] CATH LAB;  Service: Cardiology;  Laterality: N/A;    EYE SURGERY      gender reaffirmation       Allergies:   Review of patient's allergies indicates:  No Known Allergies  Medications:     Current Outpatient Medications on File Prior to Visit   Medication Sig Dispense Refill    amLODIPine (NORVASC) 5 MG tablet Take 5 mg by mouth once daily.      aspirin 81 MG Chew Chew and swallow 1 tablet (81 mg total) by mouth once daily. 90 tablet 3    atorvastatin (LIPITOR) 40 MG tablet Take 1 tablet (40 mg total) by mouth once daily. (Patient not taking: Reported on 2/2/2022) 90 tablet 3    clopidogreL (PLAVIX) 75 mg tablet Take 1 tablet (75 mg total) by mouth once daily. 30 tablet 11    estradioL (ESTRACE) 2 MG tablet Take 2 mg by mouth 2 (two) times a day.      FINASTERIDE ORAL Take 5 mg by mouth once daily.      insulin aspart U-100 (NOVOLOG) 100 unit/mL injection Inject into the skin 3  "(three) times daily before meals.      insulin detemir U-100 (LEVEMIR) 100 unit/mL injection Inject 100 Units into the skin every evening.      LANCETS MISC by Misc.(Non-Drug; Combo Route) route.      losartan (COZAAR) 25 MG tablet Take 0.5 tablets (12.5 mg total) by mouth once daily. 45 tablet 3    metoprolol tartrate (LOPRESSOR) 25 MG tablet Take 1 tablet (25 mg total) by mouth 2 (two) times daily. 60 tablet 11    minoxidil (ROGAINE TOP) Apply topically.      sodium chloride 5% (SAKINA 128) 5 % ophthalmic solution Place 1 drop into both eyes 3 (three) times daily. 15 mL 3    spironolactone (ALDACTONE) 25 MG tablet Take 25 mg by mouth once daily.       No current facility-administered medications on file prior to visit.     Social History:     Social History     Tobacco Use    Smoking status: Never Smoker    Smokeless tobacco: Never Used   Substance Use Topics    Alcohol use: Never     Family History:     Family History   Problem Relation Age of Onset    Lung cancer Mother     Stroke Father     Diabetes Father      Physical Exam:   /67 (BP Location: Left arm, Patient Position: Sitting, BP Method: Large (Automatic))   Pulse 73   Ht 6' 1" (1.854 m)   Wt 104.8 kg (231 lb 0.7 oz)   SpO2 95%   BMI 30.48 kg/m²      Constitutional: No apparent distress, conversant  Neck: No jugular venous distension, no carotid bruits  CV: Regular rate and rhythm, no murmurs, normal S1/S2  Pulm: Clear to auscultation bilaterally  Extremities: No lower extremity edema, warm with palpable pulses    Labs:     Blood Tests:  Lab Results   Component Value Date    BNP 16 01/13/2022     (L) 01/15/2022    K 3.9 01/15/2022     01/15/2022    CO2 22 (L) 01/15/2022    BUN 14 01/15/2022    CREATININE 0.9 01/15/2022     (H) 01/15/2022    HGBA1C 9.0 (H) 01/14/2022    MG 1.9 01/14/2022    AST 23 01/13/2022    ALT 10 01/13/2022    ALBUMIN 3.6 01/13/2022    PROT 7.2 01/13/2022    BILITOT 0.6 01/13/2022    WBC 10.60 " 01/15/2022    HGB 13.1 01/15/2022    HCT 40.0 01/15/2022    MCV 90 01/15/2022     01/15/2022    INR 0.9 2022       Lab Results   Component Value Date    CHOL 152 2022    HDL 57 2022    TRIG 111 2022       Lab Results   Component Value Date    LDLCALC 72.8 2022       Urine Tests:  No results found for: COLORU, APPEARANCEUA, PHUR, SPECGRAV, PROTEINUA, GLUCUA, KETONESU, BILIRUBINUA, OCCULTUA, NITRITE, UROBILINOGEN, LEUKOCYTESUR, PROTEINURINE, CREATRANDUR, UTPCR    Imaging:     Echocardiogram  TTE 22  · The left ventricle is normal in size with eccentric hypertrophy and mildly decreased systolic function.  · The estimated ejection fraction is 40%.  · There are segmental left ventricular wall motion abnormalities in the LAD territory. No thrombus in the akinetic apex.  · Grade I left ventricular diastolic dysfunction.  · Mild aortic regurgitation.  · Normal right ventricular size with normal right ventricular systolic function.  · Normal central venous pressure (3 mmHg).  · Trivial posterolateral pericardial effusion.    Stress testing  None    Cath Lab  [facility] 22  · This was a successful PCI for acute myocardial infarction.  · The Mid LAD lesion was 80% stenosed with 0% stenosis post-intervention.  · A STENT RESOLUTE JUWAN [id number] stent was successfully placed.  · The RPAV lesion was 50% stenosed, small caliber  · The 2nd Mrg lesion was 60% stenosed, small caliber  · The filling pressures on the left were mildly elevated.  · The pre-procedure left ventricular end diastolic pressure was 26.  · The estimated blood loss was <50 mL.    Other  None    EK22 - NSR, LAD, IVCD, NSTTA (personally reviewed)    Assessment:     1. CAD S/P percutaneous coronary angioplasty    2. Hyperlipidemia associated with type 2 diabetes mellitus    3. Primary hypertension    4. Type 2 diabetes mellitus with hyperglycemia, with long-term current use of insulin    5. Ischemic cardiomyopathy         Plan:     CAD S/P percutaneous coronary angioplasty  Ischemic cardiomyopathy  EF 40% at time of MI with LAD RWMAs    Continue ASA 81mg qd indefinitely  Continue plavix to complete 1y course (stop 1/15/23)  Continue atorvastatin 40mg qd  Continue metoprolol 25mg BID, losartan 12.5mg qd  Heart healthy diet, regular exericse    Check lipids, CMP  Repeat echo    Hyperlipidemia associated with type 2 diabetes mellitus  LDL not at goal  Increase to atorvastatin 80mg qd    Primary hypertension  BP at goal  Continue amlodipine 5mg qd, spironolactone 25mg qd  Continue other meds, as above  Low salt diet    Type 2 diabetes mellitus with hyperglycemia, with long-term current use of insulin  Not at goal  A1c 8% last week    Signed:  Lion Wallace MD  Cardiology     8/22/2022 9:40 AM    Follow-up:     Future Appointments   Date Time Provider Department Center   8/22/2022  9:00 AM Ace Wallace III, MD [facility] CARDIO [facility]

## 2022-08-22 ENCOUNTER — OFFICE VISIT (OUTPATIENT)
Dept: CARDIOLOGY | Facility: CLINIC | Age: 77
End: 2022-08-22
Payer: MEDICARE

## 2022-08-22 VITALS
SYSTOLIC BLOOD PRESSURE: 129 MMHG | DIASTOLIC BLOOD PRESSURE: 67 MMHG | BODY MASS INDEX: 31.3 KG/M2 | HEART RATE: 73 BPM | WEIGHT: 231.06 LBS | OXYGEN SATURATION: 95 % | HEIGHT: 72 IN

## 2022-08-22 DIAGNOSIS — E11.65 TYPE 2 DIABETES MELLITUS WITH HYPERGLYCEMIA, WITH LONG-TERM CURRENT USE OF INSULIN: ICD-10-CM

## 2022-08-22 DIAGNOSIS — I10 PRIMARY HYPERTENSION: ICD-10-CM

## 2022-08-22 DIAGNOSIS — Z98.61 CAD S/P PERCUTANEOUS CORONARY ANGIOPLASTY: Primary | ICD-10-CM

## 2022-08-22 DIAGNOSIS — Z79.4 TYPE 2 DIABETES MELLITUS WITH HYPERGLYCEMIA, WITH LONG-TERM CURRENT USE OF INSULIN: ICD-10-CM

## 2022-08-22 DIAGNOSIS — E78.5 HYPERLIPIDEMIA ASSOCIATED WITH TYPE 2 DIABETES MELLITUS: ICD-10-CM

## 2022-08-22 DIAGNOSIS — I25.5 ISCHEMIC CARDIOMYOPATHY: ICD-10-CM

## 2022-08-22 DIAGNOSIS — I25.10 CAD S/P PERCUTANEOUS CORONARY ANGIOPLASTY: Primary | ICD-10-CM

## 2022-08-22 DIAGNOSIS — E11.69 HYPERLIPIDEMIA ASSOCIATED WITH TYPE 2 DIABETES MELLITUS: ICD-10-CM

## 2022-08-22 PROCEDURE — 99999 PR PBB SHADOW E&M-EST. PATIENT-LVL IV: ICD-10-PCS | Mod: PBBFAC,,, | Performed by: INTERNAL MEDICINE

## 2022-08-22 PROCEDURE — 99999 PR PBB SHADOW E&M-EST. PATIENT-LVL IV: CPT | Mod: PBBFAC,,, | Performed by: INTERNAL MEDICINE

## 2022-08-22 PROCEDURE — 99214 OFFICE O/P EST MOD 30 MIN: CPT | Mod: S$PBB,,, | Performed by: INTERNAL MEDICINE

## 2022-08-22 PROCEDURE — 99214 OFFICE O/P EST MOD 30 MIN: CPT | Mod: PBBFAC | Performed by: INTERNAL MEDICINE

## 2022-08-22 PROCEDURE — 99214 PR OFFICE/OUTPT VISIT, EST, LEVL IV, 30-39 MIN: ICD-10-PCS | Mod: S$PBB,,, | Performed by: INTERNAL MEDICINE

## 2024-08-06 ENCOUNTER — TELEPHONE (OUTPATIENT)
Dept: DERMATOLOGY | Facility: CLINIC | Age: 79
End: 2024-08-06
Payer: MEDICARE

## 2024-08-07 ENCOUNTER — TELEPHONE (OUTPATIENT)
Dept: DERMATOLOGY | Facility: CLINIC | Age: 79
End: 2024-08-07
Payer: MEDICARE

## 2024-08-08 ENCOUNTER — OFFICE VISIT (OUTPATIENT)
Dept: DERMATOLOGY | Facility: CLINIC | Age: 79
End: 2024-08-08
Payer: MEDICARE

## 2024-08-08 DIAGNOSIS — D48.5 NEOPLASM OF UNCERTAIN BEHAVIOR OF SKIN: Primary | ICD-10-CM

## 2024-08-08 PROCEDURE — 11102 TANGNTL BX SKIN SINGLE LES: CPT | Mod: S$PBB,,, | Performed by: STUDENT IN AN ORGANIZED HEALTH CARE EDUCATION/TRAINING PROGRAM

## 2024-08-08 PROCEDURE — 11102 TANGNTL BX SKIN SINGLE LES: CPT | Mod: PBBFAC | Performed by: STUDENT IN AN ORGANIZED HEALTH CARE EDUCATION/TRAINING PROGRAM

## 2024-08-08 PROCEDURE — 99213 OFFICE O/P EST LOW 20 MIN: CPT | Mod: PBBFAC | Performed by: STUDENT IN AN ORGANIZED HEALTH CARE EDUCATION/TRAINING PROGRAM

## 2024-08-08 PROCEDURE — 99999 PR PBB SHADOW E&M-EST. PATIENT-LVL III: CPT | Mod: PBBFAC,,, | Performed by: STUDENT IN AN ORGANIZED HEALTH CARE EDUCATION/TRAINING PROGRAM

## 2024-08-08 PROCEDURE — 88305 TISSUE EXAM BY PATHOLOGIST: CPT | Performed by: DERMATOLOGY

## 2024-08-08 PROCEDURE — 99499 UNLISTED E&M SERVICE: CPT | Mod: S$PBB,,, | Performed by: STUDENT IN AN ORGANIZED HEALTH CARE EDUCATION/TRAINING PROGRAM

## 2024-08-09 LAB
FINAL PATHOLOGIC DIAGNOSIS: NORMAL
GROSS: NORMAL
Lab: NORMAL
MICROSCOPIC EXAM: NORMAL

## 2024-08-16 ENCOUNTER — TELEPHONE (OUTPATIENT)
Dept: DERMATOLOGY | Facility: CLINIC | Age: 79
End: 2024-08-16
Payer: MEDICARE

## 2024-08-20 ENCOUNTER — TELEPHONE (OUTPATIENT)
Dept: DERMATOLOGY | Facility: CLINIC | Age: 79
End: 2024-08-20

## 2024-08-23 ENCOUNTER — TELEPHONE (OUTPATIENT)
Dept: DERMATOLOGY | Facility: CLINIC | Age: 79
End: 2024-08-23
Payer: MEDICARE

## (undated) DEVICE — KIT CUSTOM MANIFOLD

## (undated) DEVICE — CATH IMPULSE 5F 100CM FR4

## (undated) DEVICE — SEE MEDLINE ITEM 156894

## (undated) DEVICE — PAD DEFIB CADENCE ADULT R2

## (undated) DEVICE — OMNIPAQUE 350 200ML

## (undated) DEVICE — GUIDE WIRE BMW 014 X190

## (undated) DEVICE — INFLATOR ENCORE 26 BLLN INFL

## (undated) DEVICE — SPIKE CONTRAST CONTROLLER

## (undated) DEVICE — CATH TREK RX 2.50MM X 12MM

## (undated) DEVICE — GUIDE LAUNCHER 6FR EBU 3.5

## (undated) DEVICE — CATH EAGLE EYE PLATINUM

## (undated) DEVICE — KIT CO-PILOT

## (undated) DEVICE — KIT PROBE COVER WITH GEL

## (undated) DEVICE — CATH FL 3.5 5FR

## (undated) DEVICE — SEE MEDLINE ITEM 157187

## (undated) DEVICE — KIT GLIDESHEATH SLEND 6FR 10CM

## (undated) DEVICE — WIRE GUIDE SAFE-T-J .035 260CM

## (undated) DEVICE — CATH NC QUANTUM APEX MR 3X15

## (undated) DEVICE — PROTECTION STATION PLUS

## (undated) DEVICE — HEMOSTAT VASC BAND REG 24CM